# Patient Record
Sex: FEMALE | Race: WHITE | NOT HISPANIC OR LATINO | Employment: FULL TIME | URBAN - METROPOLITAN AREA
[De-identification: names, ages, dates, MRNs, and addresses within clinical notes are randomized per-mention and may not be internally consistent; named-entity substitution may affect disease eponyms.]

---

## 2017-02-06 ENCOUNTER — TRANSCRIBE ORDERS (OUTPATIENT)
Dept: ADMINISTRATIVE | Facility: HOSPITAL | Age: 52
End: 2017-02-06

## 2017-02-06 DIAGNOSIS — Z00.00 REGULAR CHECK-UP: Primary | ICD-10-CM

## 2017-02-09 ENCOUNTER — HOSPITAL ENCOUNTER (OUTPATIENT)
Dept: ULTRASOUND IMAGING | Facility: HOSPITAL | Age: 52
Discharge: HOME | End: 2017-02-09
Payer: COMMERCIAL

## 2017-02-09 DIAGNOSIS — Z00.00 REGULAR CHECK-UP: ICD-10-CM

## 2017-02-09 PROCEDURE — 76536 US EXAM OF HEAD AND NECK: CPT

## 2017-06-12 ENCOUNTER — TRANSCRIBE ORDERS (OUTPATIENT)
Dept: ADMINISTRATIVE | Facility: HOSPITAL | Age: 52
End: 2017-06-12

## 2017-06-12 DIAGNOSIS — Z12.31 ENCOUNTER FOR MAMMOGRAM TO ESTABLISH BASELINE MAMMOGRAM: Primary | ICD-10-CM

## 2017-07-10 ENCOUNTER — HOSPITAL ENCOUNTER (OUTPATIENT)
Dept: MAMMOGRAPHY | Facility: HOSPITAL | Age: 52
Discharge: HOME | End: 2017-07-10
Payer: COMMERCIAL

## 2017-07-10 DIAGNOSIS — Z12.31 ENCOUNTER FOR MAMMOGRAM TO ESTABLISH BASELINE MAMMOGRAM: ICD-10-CM

## 2017-07-10 PROCEDURE — G0202 SCR MAMMO BI INCL CAD: HCPCS

## 2018-01-04 ENCOUNTER — TRANSCRIBE ORDERS (OUTPATIENT)
Dept: ADMINISTRATIVE | Facility: HOSPITAL | Age: 53
End: 2018-01-04

## 2018-01-04 DIAGNOSIS — L98.9 LESION OF NECK: ICD-10-CM

## 2018-01-04 DIAGNOSIS — IMO0002 MASS: Primary | ICD-10-CM

## 2018-01-05 ENCOUNTER — HOSPITAL ENCOUNTER (OUTPATIENT)
Dept: RADIOLOGY | Age: 53
Discharge: HOME | End: 2018-01-05
Payer: COMMERCIAL

## 2018-01-05 ENCOUNTER — GENERIC CONVERSION - ENCOUNTER (OUTPATIENT)
Dept: OTHER | Facility: OTHER | Age: 53
End: 2018-01-05

## 2018-01-05 ENCOUNTER — APPOINTMENT (OUTPATIENT)
Dept: RADIOLOGY | Age: 53
End: 2018-01-05
Payer: COMMERCIAL

## 2018-01-05 DIAGNOSIS — IMO0002 MASS: ICD-10-CM

## 2018-01-05 DIAGNOSIS — L98.9 LESION OF NECK: ICD-10-CM

## 2018-01-05 PROCEDURE — 76536 US EXAM OF HEAD AND NECK: CPT

## 2018-01-05 PROCEDURE — 71046 X-RAY EXAM CHEST 2 VIEWS: CPT

## 2018-01-29 ENCOUNTER — TRANSCRIBE ORDERS (OUTPATIENT)
Dept: ADMINISTRATIVE | Facility: HOSPITAL | Age: 53
End: 2018-01-29

## 2018-01-29 DIAGNOSIS — N63.20 LEFT BREAST LUMP: Primary | ICD-10-CM

## 2018-01-31 ENCOUNTER — HOSPITAL ENCOUNTER (OUTPATIENT)
Dept: ULTRASOUND IMAGING | Facility: CLINIC | Age: 53
Discharge: HOME | End: 2018-01-31
Payer: COMMERCIAL

## 2018-01-31 ENCOUNTER — HOSPITAL ENCOUNTER (OUTPATIENT)
Dept: MAMMOGRAPHY | Facility: CLINIC | Age: 53
Discharge: HOME | End: 2018-01-31
Payer: COMMERCIAL

## 2018-01-31 DIAGNOSIS — N63.20 LEFT BREAST LUMP: ICD-10-CM

## 2018-01-31 PROCEDURE — 76642 ULTRASOUND BREAST LIMITED: CPT

## 2018-01-31 PROCEDURE — 77065 DX MAMMO INCL CAD UNI: CPT

## 2018-08-06 ENCOUNTER — TRANSCRIBE ORDERS (OUTPATIENT)
Dept: ADMINISTRATIVE | Facility: HOSPITAL | Age: 53
End: 2018-08-06

## 2018-08-06 DIAGNOSIS — Z12.31 ENCOUNTER FOR SCREENING MAMMOGRAM FOR MALIGNANT NEOPLASM OF BREAST: Primary | ICD-10-CM

## 2018-08-21 ENCOUNTER — HOSPITAL ENCOUNTER (OUTPATIENT)
Dept: MAMMOGRAPHY | Facility: CLINIC | Age: 53
Discharge: HOME | End: 2018-08-21
Payer: COMMERCIAL

## 2018-08-21 DIAGNOSIS — Z12.31 ENCOUNTER FOR SCREENING MAMMOGRAM FOR MALIGNANT NEOPLASM OF BREAST: ICD-10-CM

## 2018-08-21 PROCEDURE — 77063 BREAST TOMOSYNTHESIS BI: CPT

## 2018-08-21 PROCEDURE — 77067 SCR MAMMO BI INCL CAD: CPT

## 2018-11-30 ENCOUNTER — HOSPITAL ENCOUNTER (EMERGENCY)
Facility: HOSPITAL | Age: 53
Discharge: HOME | End: 2018-11-30
Attending: EMERGENCY MEDICINE
Payer: COMMERCIAL

## 2018-11-30 ENCOUNTER — APPOINTMENT (EMERGENCY)
Dept: RADIOLOGY | Facility: HOSPITAL | Age: 53
End: 2018-11-30
Payer: COMMERCIAL

## 2018-11-30 VITALS
RESPIRATION RATE: 16 BRPM | SYSTOLIC BLOOD PRESSURE: 147 MMHG | OXYGEN SATURATION: 98 % | WEIGHT: 203.09 LBS | BODY MASS INDEX: 34.86 KG/M2 | DIASTOLIC BLOOD PRESSURE: 70 MMHG | TEMPERATURE: 97.7 F | HEART RATE: 55 BPM

## 2018-11-30 DIAGNOSIS — R07.9 CHEST PAIN: Primary | ICD-10-CM

## 2018-11-30 LAB
ALBUMIN SERPL BCP-MCNC: 3.7 G/DL (ref 3.5–5)
ALP SERPL-CCNC: 99 U/L (ref 46–116)
ALT SERPL W P-5'-P-CCNC: 21 U/L (ref 12–78)
ANION GAP SERPL CALCULATED.3IONS-SCNC: 9 MMOL/L (ref 4–13)
AST SERPL W P-5'-P-CCNC: 13 U/L (ref 5–45)
ATRIAL RATE: 54 BPM
BASOPHILS # BLD AUTO: 0.05 THOUSANDS/ΜL (ref 0–0.1)
BASOPHILS NFR BLD AUTO: 1 % (ref 0–1)
BILIRUB SERPL-MCNC: 0.5 MG/DL (ref 0.2–1)
BUN SERPL-MCNC: 14 MG/DL (ref 5–25)
CALCIUM SERPL-MCNC: 8.7 MG/DL (ref 8.3–10.1)
CHLORIDE SERPL-SCNC: 104 MMOL/L (ref 100–108)
CO2 SERPL-SCNC: 26 MMOL/L (ref 21–32)
CREAT SERPL-MCNC: 0.61 MG/DL (ref 0.6–1.3)
DEPRECATED D DIMER PPP: <270 NG/ML (FEU)
EOSINOPHIL # BLD AUTO: 0.13 THOUSAND/ΜL (ref 0–0.61)
EOSINOPHIL NFR BLD AUTO: 2 % (ref 0–6)
ERYTHROCYTE [DISTWIDTH] IN BLOOD BY AUTOMATED COUNT: 16.7 % (ref 11.6–15.1)
GFR SERPL CREATININE-BSD FRML MDRD: 105 ML/MIN/1.73SQ M
GLUCOSE SERPL-MCNC: 93 MG/DL (ref 65–140)
HCT VFR BLD AUTO: 40.7 % (ref 34.8–46.1)
HGB BLD-MCNC: 13 G/DL (ref 11.5–15.4)
IMM GRANULOCYTES # BLD AUTO: 0.03 THOUSAND/UL (ref 0–0.2)
IMM GRANULOCYTES NFR BLD AUTO: 0 % (ref 0–2)
LYMPHOCYTES # BLD AUTO: 3.18 THOUSANDS/ΜL (ref 0.6–4.47)
LYMPHOCYTES NFR BLD AUTO: 38 % (ref 14–44)
MCH RBC QN AUTO: 25.6 PG (ref 26.8–34.3)
MCHC RBC AUTO-ENTMCNC: 31.9 G/DL (ref 31.4–37.4)
MCV RBC AUTO: 80 FL (ref 82–98)
MONOCYTES # BLD AUTO: 0.51 THOUSAND/ΜL (ref 0.17–1.22)
MONOCYTES NFR BLD AUTO: 6 % (ref 4–12)
NEUTROPHILS # BLD AUTO: 4.47 THOUSANDS/ΜL (ref 1.85–7.62)
NEUTS SEG NFR BLD AUTO: 53 % (ref 43–75)
NRBC BLD AUTO-RTO: 0 /100 WBCS
P AXIS: 33 DEGREES
PLATELET # BLD AUTO: 363 THOUSANDS/UL (ref 149–390)
PMV BLD AUTO: 10 FL (ref 8.9–12.7)
POTASSIUM SERPL-SCNC: 4 MMOL/L (ref 3.5–5.3)
PR INTERVAL: 142 MS
PROT SERPL-MCNC: 7.6 G/DL (ref 6.4–8.2)
QRS AXIS: -67 DEGREES
QRSD INTERVAL: 82 MS
QT INTERVAL: 470 MS
QTC INTERVAL: 445 MS
RBC # BLD AUTO: 5.08 MILLION/UL (ref 3.81–5.12)
SODIUM SERPL-SCNC: 139 MMOL/L (ref 136–145)
T WAVE AXIS: 3 DEGREES
TROPONIN I SERPL-MCNC: <0.02 NG/ML
TSH SERPL DL<=0.05 MIU/L-ACNC: 1.21 UIU/ML (ref 0.36–3.74)
VENTRICULAR RATE: 54 BPM
WBC # BLD AUTO: 8.37 THOUSAND/UL (ref 4.31–10.16)

## 2018-11-30 PROCEDURE — 84443 ASSAY THYROID STIM HORMONE: CPT | Performed by: EMERGENCY MEDICINE

## 2018-11-30 PROCEDURE — 84484 ASSAY OF TROPONIN QUANT: CPT | Performed by: EMERGENCY MEDICINE

## 2018-11-30 PROCEDURE — 85379 FIBRIN DEGRADATION QUANT: CPT | Performed by: EMERGENCY MEDICINE

## 2018-11-30 PROCEDURE — 71046 X-RAY EXAM CHEST 2 VIEWS: CPT

## 2018-11-30 PROCEDURE — 85025 COMPLETE CBC W/AUTO DIFF WBC: CPT | Performed by: EMERGENCY MEDICINE

## 2018-11-30 PROCEDURE — 99285 EMERGENCY DEPT VISIT HI MDM: CPT

## 2018-11-30 PROCEDURE — 93010 ELECTROCARDIOGRAM REPORT: CPT | Performed by: INTERNAL MEDICINE

## 2018-11-30 PROCEDURE — 80053 COMPREHEN METABOLIC PANEL: CPT | Performed by: EMERGENCY MEDICINE

## 2018-11-30 PROCEDURE — 36415 COLL VENOUS BLD VENIPUNCTURE: CPT | Performed by: EMERGENCY MEDICINE

## 2018-11-30 PROCEDURE — 93005 ELECTROCARDIOGRAM TRACING: CPT

## 2018-11-30 NOTE — ED PROVIDER NOTES
History  Chief Complaint   Patient presents with    Chest Pain     Pt states on Monday she had an episode of palpiations  Today pt was standing at sink and had about a 30 min episode of left sided stabbing chest pain  Pt states also that she has lots of pressure in her chest waking her up out of her sleep  50-year-old female presents today complaining of chest pain which started this morning approximately 4 hours ago     It occurred while she was standing at the sink getting ready for work  She reports having palpitations approximately 1 week ago that lasted few minutes  The chest pain lasted for maybe 30 minutes  It did not radiate  She denies shortness of breath, nausea, or diaphoresis  There are no exacerbating or alleviating factors  History provided by:  Patient  Chest Pain   Pain location:  L chest  Pain quality: pressure    Pain radiates to:  Does not radiate  Pain radiates to the back: no    Pain severity:  Mild  Onset quality:  Sudden  Duration:  30 minutes  Timing:  Constant  Progression:  Resolved  Chronicity:  New  Context: at rest    Relieved by:  None tried  Worsened by:  Nothing tried  Ineffective treatments:  None tried  Associated symptoms: palpitations    Associated symptoms: no abdominal pain, no altered mental status, no back pain, no cough, no dizziness, no fever, no headache, no nausea, no shortness of breath and no weakness    Risk factors: no coronary artery disease, no high cholesterol and no hypertension        None       Past Medical History:   Diagnosis Date    Breast cancer ([facility] [address] )     Mitral valve prolapse        Past Surgical History:   Procedure Laterality Date     SECTION      MASTECTOMY         History reviewed  No pertinent family history  I have reviewed and agree with the history as documented      Social History   Substance Use Topics    Smoking status: Never Smoker    Smokeless tobacco: Not on file    Alcohol use Yes      Comment: weekly Review of Systems   Constitutional: Negative for chills and fever  HENT: Negative for congestion  Respiratory: Negative for cough and shortness of breath  Cardiovascular: Positive for chest pain and palpitations  Gastrointestinal: Negative for abdominal pain and nausea  Genitourinary: Negative for difficulty urinating  Musculoskeletal: Negative for back pain  Skin: Negative for pallor, rash and wound  Neurological: Negative for dizziness, weakness and headaches  Psychiatric/Behavioral: Negative for confusion  Physical Exam  Physical Exam   Constitutional: She is oriented to person, place, and time  She appears well-developed and well-nourished  She appears distressed  HENT:   Head: Normocephalic and atraumatic  Eyes: Pupils are equal, round, and reactive to light  EOM are normal    Neck: Normal range of motion  Neck supple  Cardiovascular: Normal rate and regular rhythm  Pulmonary/Chest: Effort normal and breath sounds normal    Abdominal: Soft  Bowel sounds are normal    Musculoskeletal: Normal range of motion  She exhibits no edema  Neurological: She is alert and oriented to person, place, and time  Skin: Skin is warm and dry  Capillary refill takes less than 2 seconds  Psychiatric: She has a normal mood and affect         Vital Signs  ED Triage Vitals   Temperature Pulse Respirations Blood Pressure SpO2   11/30/18 1121 11/30/18 1119 11/30/18 1119 11/30/18 1119 11/30/18 1119   97 7 °F (36 5 °C) 61 16 156/71 99 %      Temp Source Heart Rate Source Patient Position - Orthostatic VS BP Location FiO2 (%)   11/30/18 1121 11/30/18 1119 11/30/18 1119 11/30/18 1119 --   Oral Monitor Lying Right arm       Pain Score       11/30/18 1116       No Pain           Vitals:    11/30/18 1130 11/30/18 1132 11/30/18 1145 11/30/18 1219   BP: 156/71 135/77 135/77 147/70   Pulse: 64 63 62 55   Patient Position - Orthostatic VS:  Sitting  Sitting       Visual Acuity      ED Medications  Medications - No data to display    Diagnostic Studies  Results Reviewed     Procedure Component Value Units Date/Time    TSH [[id number]]  (Normal) Collected:  11/30/18 1145    Lab Status:  Final result Specimen:  Blood from Arm, Left Updated:  11/30/18 1218     TSH 3RD GENERATON 1 210 uIU/mL     Narrative:         Patients undergoing fluorescein dye angiography may retain small amounts of fluorescein in the body for 48-72 hours post procedure  Samples containing fluorescein can produce falsely depressed TSH values  If the patient had this procedure,a specimen should be resubmitted post fluorescein clearance  The recommended reference ranges for TSH during pregnancy are as follows:  First trimester 0 1 to 2 5 uIU/mL  Second trimester  0 2 to 3 0 uIU/mL  Third trimester 0 3 to 3 0 uIU/m      Comprehensive metabolic panel [[id number]] Collected:  11/30/18 1145    Lab Status:  Final result Specimen:  Blood from Arm, Left Updated:  11/30/18 1209     Sodium 139 mmol/L      Potassium 4 0 mmol/L      Chloride 104 mmol/L      CO2 26 mmol/L      ANION GAP 9 mmol/L      BUN 14 mg/dL      Creatinine 0 61 mg/dL      Glucose 93 mg/dL      Calcium 8 7 mg/dL      AST 13 U/L      ALT 21 U/L      Alkaline Phosphatase 99 U/L      Total Protein 7 6 g/dL      Albumin 3 7 g/dL      Total Bilirubin 0 50 mg/dL      eGFR 105 ml/min/1 73sq m     Narrative:         National Kidney Disease Education Program recommendations are as follows:  GFR calculation is accurate only with a steady state creatinine  Chronic Kidney disease less than 60 ml/min/1 73 sq  meters  Kidney failure less than 15 ml/min/1 73 sq  meters      Troponin I [[id number]]  (Normal) Collected:  11/30/18 1145    Lab Status:  Final result Specimen:  Blood from Arm, Left Updated:  11/30/18 1209     Troponin I <0 02 ng/mL     D-Dimer [[id number]]  (Normal) Collected:  11/30/18 1147    Lab Status:  Final result Specimen:  Blood from Arm, Left Updated:  11/30/18 1204 D-Dimer, Quant <270 ng/ml (FEU)     CBC and differential [[id number]]  (Abnormal) Collected:  11/30/18 1145    Lab Status:  Final result Specimen:  Blood from Arm, Left Updated:  11/30/18 1156     WBC 8 37 Thousand/uL      RBC 5 08 Million/uL      Hemoglobin 13 0 g/dL      Hematocrit 40 7 %      MCV 80 (L) fL      MCH 25 6 (L) pg      MCHC 31 9 g/dL      RDW 16 7 (H) %      MPV 10 0 fL      Platelets 617 Thousands/uL      nRBC 0 /100 WBCs      Neutrophils Relative 53 %      Immat GRANS % 0 %      Lymphocytes Relative 38 %      Monocytes Relative 6 %      Eosinophils Relative 2 %      Basophils Relative 1 %      Neutrophils Absolute 4 47 Thousands/µL      Immature Grans Absolute 0 03 Thousand/uL      Lymphocytes Absolute 3 18 Thousands/µL      Monocytes Absolute 0 51 Thousand/µL      Eosinophils Absolute 0 13 Thousand/µL      Basophils Absolute 0 05 Thousands/µL                  XR chest 2 views    (Results Pending)              Procedures  ECG 12 Lead Documentation  Date/Time: 11/30/2018 11:48 AM  Performed by: Adeline Carcamo  Authorized by: Adeline Carcamo     Indications / Diagnosis:  Chest pain  ECG reviewed by me, the ED Provider: yes    Patient location:  ED  Previous ECG:     Previous ECG:  Unavailable  Comments:      Sinus bradycardia at 54 beats per minute  Leftward axis with a left anterior fascicular block  Slow R progression  T-wave inversion in lead 3 and AVF  T-wave flattening in the precordial leads    No significant change compared to prior from 06/25/08           Phone Contacts  ED Phone Contact    ED Course         HEART Risk Score      Most Recent Value   History  0 Filed at: 11/30/2018 1233   ECG  0 Filed at: 11/30/2018 1233   Age  1 Filed at: 11/30/2018 1233   Risk Factors  0 Filed at: 11/30/2018 1233   Troponin  0 Filed at: 11/30/2018 1233   Heart Score Risk Calculator   History  0 Filed at: 11/30/2018 1233   ECG  0 Filed at: 11/30/2018 1233   Age  1 Filed at: 11/30/2018 1233   Risk Factors  0 Filed at: 11/30/2018 1233   Troponin  0 Filed at: 11/30/2018 1233   HEART Score  1 Filed at: 11/30/2018 1233   HEART Score  1 Filed at: 11/30/2018 1233                            MDM  Number of Diagnoses or Management Options  Chest pain: new and requires workup  Diagnosis management comments: 12:41 PM  The patient and I discussed the possibility of them being admitted to the hospital for testing and observation and they understand the estimated risk of a heart attack or death is about 0 4% or 1 in 250   Based on that number, the patient has chosen to follow up with their primary care doctor instead  The patient is clinically sober and appears free from distracting injury  The patient appears to have intact insight, judgment, and reason  In my opinion, this patient has the capacity to make decisions  Amount and/or Complexity of Data Reviewed  Clinical lab tests: ordered and reviewed  Tests in the radiology section of CPT®: ordered and reviewed  Tests in the medicine section of CPT®: reviewed and ordered  Decide to obtain previous medical records or to obtain history from someone other than the patient: yes  Review and summarize past medical records: yes  Independent visualization of images, tracings, or specimens: yes    Risk of Complications, Morbidity, and/or Mortality  Presenting problems: high  Diagnostic procedures: high  Management options: moderate    Patient Progress  Patient progress: stable    CritCare Time    Disposition  Final diagnoses:   Chest pain     Time reflects when diagnosis was documented in both MDM as applicable and the Disposition within this note     Time User Action Codes Description Comment    11/30/2018 11:49 AM Janette Lagos Add [R07 9] Chest pain       ED Disposition     ED Disposition Condition Comment    Discharge  Daily Ibrahim discharge to home/self care      Condition at discharge: Stable        Follow-up Information     Follow up With Specialties Details Why Contact Info Additional Information    Sohail Riojas MD Internal Medicine Schedule an appointment as soon as possible for a visit  [address] [facility] [address] [facility] Emergency Department Emergency Medicine  If symptoms worsen [address] [address]  [phone] [facility] ED, [address], [place], [address] [facility], [address]          Patient's Medications    No medications on file     No discharge procedures on file      ED Provider  Electronically Signed by           Marly Luke DO  11/30/18 1244

## 2018-11-30 NOTE — DISCHARGE INSTRUCTIONS
Chest Pain   WHAT YOU NEED TO KNOW:   Chest pain can be caused by a range of conditions, from not serious to life-threatening  Chest pain can be a symptom of a digestive problem, such as acid reflux or a stomach ulcer  An anxiety attack or a strong emotion, such as anger, can also cause chest pain  Infection, inflammation, or a fracture in the bones or cartilage in your chest can cause pain or discomfort  Sometimes chest pain or pressure is caused by poor blood flow to your heart (angina)  Chest pain may also be caused by life-threatening conditions such as a heart attack or blood clot in your lungs  DISCHARGE INSTRUCTIONS:   Call 911 if:   · You have any of the following signs of a heart attack:      ¨ Squeezing, pressure, or pain in your chest that lasts longer than 5 minutes or returns    ¨ Discomfort or pain in your back, neck, jaw, stomach, or arm     ¨ Trouble breathing    ¨ Nausea or vomiting    ¨ Lightheadedness or a sudden cold sweat, especially with chest pain or trouble breathing    Return to the emergency department if:   · You have chest discomfort that gets worse, even with medicine  · You cough or vomit blood  · Your bowel movements are black or bloody  · You cannot stop vomiting, or it hurts to swallow  Contact your healthcare provider if:   · You have questions or concerns about your condition or care  Medicines:   · Medicines  may be given to treat the cause of your chest pain  Examples include pain medicine, anxiety medicine, or medicines to increase blood flow to your heart  · Do not take certain medicines without asking your healthcare provider first   These include NSAIDs, herbal or vitamin supplements, or hormones (estrogen or progestin)  · Take your medicine as directed  Contact your healthcare provider if you think your medicine is not helping or if you have side effects  Tell him or her if you are allergic to any medicine   Keep a list of the medicines, vitamins, and herbs you take  Include the amounts, and when and why you take them  Bring the list or the pill bottles to follow-up visits  Carry your medicine list with you in case of an emergency  Follow up with your healthcare provider within 72 hours, or as directed: You may need to return for more tests to find the cause of your chest pain  You may be referred to a specialist, such as a cardiologist or gastroenterologist  Write down your questions so you remember to ask them during your visits  Healthy living tips: The following are general healthy guidelines  If your chest pain is caused by a heart problem, your healthcare provider will give you specific guidelines to follow  · Do not smoke  Nicotine and other chemicals in cigarettes and cigars can cause lung and heart damage  Ask your healthcare provider for information if you currently smoke and need help to quit  E-cigarettes or smokeless tobacco still contain nicotine  Talk to your healthcare provider before you use these products  · Eat a variety of healthy, low-fat foods  Healthy foods include fruits, vegetables, whole-grain breads, low-fat dairy products, beans, lean meats, and fish  Ask for more information about a heart healthy diet  · Ask about activity  Your healthcare provider will tell you which activities to limit or avoid  Ask when you can drive, return to work, and have sex  Ask about the best exercise plan for you  · Maintain a healthy weight  Ask your healthcare provider how much you should weigh  Ask him or her to help you create a weight loss plan if you are overweight  · Get the flu and pneumonia vaccines  All adults should get the influenza (flu) vaccine  Get it every year as soon as it becomes available  The pneumococcal vaccine is given to adults aged 72 years or older  The vaccine is given every 5 years to prevent pneumococcal disease, such as pneumonia    © 2017 [employer] Information is for End User's use only and may not be sold, redistributed or otherwise used for commercial purposes  All illustrations and images included in CareNotes® are the copyrighted property of A D A M , Inc  or Aldo Dwyer  The above information is an  only  It is not intended as medical advice for individual conditions or treatments  Talk to your doctor, nurse or pharmacist before following any medical regimen to see if it is safe and effective for you

## 2019-04-23 ENCOUNTER — TRANSCRIBE ORDERS (OUTPATIENT)
Dept: ADMINISTRATIVE | Facility: HOSPITAL | Age: 54
End: 2019-04-23

## 2019-04-23 DIAGNOSIS — N63.0 LUMP OR MASS IN BREAST: Primary | ICD-10-CM

## 2019-04-24 ENCOUNTER — HOSPITAL ENCOUNTER (OUTPATIENT)
Dept: ULTRASOUND IMAGING | Facility: CLINIC | Age: 54
Discharge: HOME | End: 2019-04-24
Payer: COMMERCIAL

## 2019-04-24 ENCOUNTER — HOSPITAL ENCOUNTER (OUTPATIENT)
Dept: MAMMOGRAPHY | Facility: CLINIC | Age: 54
Discharge: HOME | End: 2019-04-24
Payer: COMMERCIAL

## 2019-04-24 VITALS — WEIGHT: 193 LBS | HEIGHT: 63 IN | BODY MASS INDEX: 34.2 KG/M2

## 2019-04-24 DIAGNOSIS — N63.0 LUMP OR MASS IN BREAST: ICD-10-CM

## 2019-04-24 PROCEDURE — 76642 ULTRASOUND BREAST LIMITED: CPT

## 2019-04-24 PROCEDURE — 77065 DX MAMMO INCL CAD UNI: CPT

## 2019-04-24 PROCEDURE — G0279 TOMOSYNTHESIS, MAMMO: HCPCS

## 2019-10-04 ENCOUNTER — TRANSCRIBE ORDERS (OUTPATIENT)
Dept: ADMINISTRATIVE | Facility: HOSPITAL | Age: 54
End: 2019-10-04

## 2019-10-04 DIAGNOSIS — N63.0 LUMP OR MASS IN BREAST: Primary | ICD-10-CM

## 2019-10-07 ENCOUNTER — HOSPITAL ENCOUNTER (OUTPATIENT)
Dept: MAMMOGRAPHY | Facility: CLINIC | Age: 54
Discharge: HOME | End: 2019-10-07
Payer: COMMERCIAL

## 2019-10-07 DIAGNOSIS — N63.0 LUMP OR MASS IN BREAST: ICD-10-CM

## 2019-10-07 PROCEDURE — 77065 DX MAMMO INCL CAD UNI: CPT

## 2019-10-07 PROCEDURE — G0279 TOMOSYNTHESIS, MAMMO: HCPCS

## 2020-09-15 ENCOUNTER — TRANSCRIBE ORDERS (OUTPATIENT)
Dept: ADMINISTRATIVE | Facility: HOSPITAL | Age: 55
End: 2020-09-15

## 2020-09-15 DIAGNOSIS — Z85.3 PERSONAL HISTORY OF MALIGNANT NEOPLASM OF BREAST: Primary | ICD-10-CM

## 2020-09-23 ENCOUNTER — HOSPITAL ENCOUNTER (OUTPATIENT)
Dept: MAMMOGRAPHY | Facility: CLINIC | Age: 55
Discharge: HOME | End: 2020-09-23
Payer: COMMERCIAL

## 2020-09-23 VITALS — HEIGHT: 63 IN | WEIGHT: 193 LBS | BODY MASS INDEX: 34.2 KG/M2 | TEMPERATURE: 97 F

## 2020-09-23 DIAGNOSIS — Z85.3 PERSONAL HISTORY OF MALIGNANT NEOPLASM OF BREAST: ICD-10-CM

## 2020-09-23 PROCEDURE — 77065 DX MAMMO INCL CAD UNI: CPT

## 2020-09-23 PROCEDURE — G0279 TOMOSYNTHESIS, MAMMO: HCPCS

## 2021-06-07 ENCOUNTER — APPOINTMENT (EMERGENCY)
Dept: CT IMAGING | Facility: HOSPITAL | Age: 56
End: 2021-06-07
Payer: COMMERCIAL

## 2021-06-07 ENCOUNTER — HOSPITAL ENCOUNTER (EMERGENCY)
Facility: HOSPITAL | Age: 56
Discharge: HOME | End: 2021-06-07
Attending: EMERGENCY MEDICINE | Admitting: EMERGENCY MEDICINE
Payer: COMMERCIAL

## 2021-06-07 VITALS
HEART RATE: 54 BPM | OXYGEN SATURATION: 98 % | HEIGHT: 63 IN | RESPIRATION RATE: 16 BRPM | BODY MASS INDEX: 34.2 KG/M2 | WEIGHT: 193 LBS | SYSTOLIC BLOOD PRESSURE: 144 MMHG | DIASTOLIC BLOOD PRESSURE: 69 MMHG | TEMPERATURE: 97.9 F

## 2021-06-07 DIAGNOSIS — R10.9 RIGHT FLANK PAIN: Primary | ICD-10-CM

## 2021-06-07 LAB
ANION GAP SERPL CALCULATED.3IONS-SCNC: 8 MMOL/L (ref 4–13)
BASOPHILS # BLD AUTO: 0.05 THOUSANDS/ΜL (ref 0–0.1)
BASOPHILS NFR BLD AUTO: 1 % (ref 0–1)
BILIRUB UR QL STRIP: NEGATIVE
BUN SERPL-MCNC: 14 MG/DL (ref 5–25)
CALCIUM SERPL-MCNC: 8.9 MG/DL (ref 8.3–10.1)
CHLORIDE SERPL-SCNC: 105 MMOL/L (ref 100–108)
CLARITY UR: CLEAR
CO2 SERPL-SCNC: 28 MMOL/L (ref 21–32)
COLOR UR: YELLOW
CREAT SERPL-MCNC: 0.76 MG/DL (ref 0.6–1.3)
EOSINOPHIL # BLD AUTO: 0.2 THOUSAND/ΜL (ref 0–0.61)
EOSINOPHIL NFR BLD AUTO: 3 % (ref 0–6)
ERYTHROCYTE [DISTWIDTH] IN BLOOD BY AUTOMATED COUNT: 16.7 % (ref 11.6–15.1)
GFR SERPL CREATININE-BSD FRML MDRD: 89 ML/MIN/1.73SQ M
GLUCOSE SERPL-MCNC: 122 MG/DL (ref 65–140)
GLUCOSE UR STRIP-MCNC: NEGATIVE MG/DL
HCT VFR BLD AUTO: 43.8 % (ref 34.8–46.1)
HGB BLD-MCNC: 14.2 G/DL (ref 11.5–15.4)
HGB UR QL STRIP.AUTO: NEGATIVE
IMM GRANULOCYTES # BLD AUTO: 0.03 THOUSAND/UL (ref 0–0.2)
IMM GRANULOCYTES NFR BLD AUTO: 0 % (ref 0–2)
KETONES UR STRIP-MCNC: ABNORMAL MG/DL
LEUKOCYTE ESTERASE UR QL STRIP: NEGATIVE
LYMPHOCYTES # BLD AUTO: 2.48 THOUSANDS/ΜL (ref 0.6–4.47)
LYMPHOCYTES NFR BLD AUTO: 36 % (ref 14–44)
MCH RBC QN AUTO: 26.9 PG (ref 26.8–34.3)
MCHC RBC AUTO-ENTMCNC: 32.4 G/DL (ref 31.4–37.4)
MCV RBC AUTO: 83 FL (ref 82–98)
MONOCYTES # BLD AUTO: 0.51 THOUSAND/ΜL (ref 0.17–1.22)
MONOCYTES NFR BLD AUTO: 8 % (ref 4–12)
NEUTROPHILS # BLD AUTO: 3.57 THOUSANDS/ΜL (ref 1.85–7.62)
NEUTS SEG NFR BLD AUTO: 52 % (ref 43–75)
NITRITE UR QL STRIP: NEGATIVE
NRBC BLD AUTO-RTO: 0 /100 WBCS
PH UR STRIP.AUTO: 7 [PH] (ref 4.5–8)
PLATELET # BLD AUTO: 344 THOUSANDS/UL (ref 149–390)
PMV BLD AUTO: 9.8 FL (ref 8.9–12.7)
POTASSIUM SERPL-SCNC: 4 MMOL/L (ref 3.5–5.3)
PROT UR STRIP-MCNC: NEGATIVE MG/DL
RBC # BLD AUTO: 5.27 MILLION/UL (ref 3.81–5.12)
SODIUM SERPL-SCNC: 141 MMOL/L (ref 136–145)
SP GR UR STRIP.AUTO: 1.02 (ref 1–1.03)
UROBILINOGEN UR QL STRIP.AUTO: 0.2 E.U./DL
WBC # BLD AUTO: 6.84 THOUSAND/UL (ref 4.31–10.16)

## 2021-06-07 PROCEDURE — 81003 URINALYSIS AUTO W/O SCOPE: CPT

## 2021-06-07 PROCEDURE — 36415 COLL VENOUS BLD VENIPUNCTURE: CPT | Performed by: EMERGENCY MEDICINE

## 2021-06-07 PROCEDURE — 74176 CT ABD & PELVIS W/O CONTRAST: CPT

## 2021-06-07 PROCEDURE — 96366 THER/PROPH/DIAG IV INF ADDON: CPT

## 2021-06-07 PROCEDURE — 96375 TX/PRO/DX INJ NEW DRUG ADDON: CPT

## 2021-06-07 PROCEDURE — 99284 EMERGENCY DEPT VISIT MOD MDM: CPT

## 2021-06-07 PROCEDURE — 80048 BASIC METABOLIC PNL TOTAL CA: CPT | Performed by: EMERGENCY MEDICINE

## 2021-06-07 PROCEDURE — 85025 COMPLETE CBC W/AUTO DIFF WBC: CPT | Performed by: EMERGENCY MEDICINE

## 2021-06-07 PROCEDURE — 96365 THER/PROPH/DIAG IV INF INIT: CPT

## 2021-06-07 PROCEDURE — 99285 EMERGENCY DEPT VISIT HI MDM: CPT | Performed by: EMERGENCY MEDICINE

## 2021-06-07 RX ORDER — ONDANSETRON 2 MG/ML
4 INJECTION INTRAMUSCULAR; INTRAVENOUS ONCE
Status: COMPLETED | OUTPATIENT
Start: 2021-06-07 | End: 2021-06-07

## 2021-06-07 RX ORDER — HYDROMORPHONE HCL/PF 1 MG/ML
1 SYRINGE (ML) INJECTION ONCE
Status: COMPLETED | OUTPATIENT
Start: 2021-06-07 | End: 2021-06-07

## 2021-06-07 RX ORDER — KETOROLAC TROMETHAMINE 30 MG/ML
15 INJECTION, SOLUTION INTRAMUSCULAR; INTRAVENOUS ONCE
Status: COMPLETED | OUTPATIENT
Start: 2021-06-07 | End: 2021-06-07

## 2021-06-07 RX ADMIN — HYDROMORPHONE HYDROCHLORIDE 1 MG: 1 INJECTION, SOLUTION INTRAMUSCULAR; INTRAVENOUS; SUBCUTANEOUS at 06:27

## 2021-06-07 RX ADMIN — KETOROLAC TROMETHAMINE 15 MG: 30 INJECTION, SOLUTION INTRAMUSCULAR at 05:58

## 2021-06-07 RX ADMIN — SODIUM CHLORIDE, SODIUM LACTATE, POTASSIUM CHLORIDE, AND CALCIUM CHLORIDE 1000 ML: .6; .31; .03; .02 INJECTION, SOLUTION INTRAVENOUS at 05:57

## 2021-06-07 RX ADMIN — ONDANSETRON 4 MG: 2 INJECTION INTRAMUSCULAR; INTRAVENOUS at 05:58

## 2021-06-07 NOTE — ED PROVIDER NOTES
History  Chief Complaint   Patient presents with    Flank Pain     r flank pain that started yesterday  denies hx of kidney stones  denies urinary symptoms  This is a 48 y o  old female who presents to the ED for evaluation of flank pain  Right-sided flank pain since yesterday, intermittent, sharp, can not get comfortable  Took ibuprofen twice with minimal relief  No urinary symptoms  No history of kidney stones  Urinated prior to coming to the ER  No chest pain or shortness of breath, fever chills  No recent trauma  None     Past Medical History:   Diagnosis Date    BRCA1 negative     BRCA2 negative     Breast cancer ([facility] [address] ) 2008    Right    History of radiation therapy     Mitral valve prolapse      Past Surgical History:   Procedure Laterality Date    BREAST BIOPSY Right 2008    Stereo     SECTION      MASTECTOMY Right 2008      Family History   Problem Relation Age of Onset    Breast cancer Mother 47    No Known Problems Father     No Known Problems Daughter     No Known Problems Maternal Grandmother     No Known Problems Maternal Grandfather     No Known Problems Paternal Grandmother     No Known Problems Paternal Grandfather     No Known Problems Daughter     No Known Problems Daughter     No Known Problems Daughter      I have reviewed and agree with the history as documented  E-Cigarette/Vaping     E-Cigarette/Vaping Substances     Social History     Tobacco Use    Smoking status: Never Smoker   Substance Use Topics    Alcohol use: Yes     Comment: weekly     Drug use: No     Review of Systems   Constitutional: Negative for chills, fatigue, fever and unexpected weight change  HENT: Negative for congestion, rhinorrhea and sore throat  Eyes: Negative for redness and visual disturbance  Respiratory: Negative for cough and shortness of breath  Cardiovascular: Negative for chest pain and leg swelling     Gastrointestinal: Negative for abdominal pain, constipation, diarrhea, nausea and vomiting  Endocrine: Negative for cold intolerance and heat intolerance  Genitourinary: Positive for flank pain  Negative for dysuria, frequency and urgency  Musculoskeletal: Negative for back pain  Skin: Negative for rash  Neurological: Negative for dizziness, syncope and numbness  All other systems reviewed and are negative  Physical Exam  Physical Exam  Vitals signs and nursing note reviewed  Constitutional:       General: She is not in acute distress  Appearance: She is well-developed  She is not diaphoretic  HENT:      Head: Normocephalic and atraumatic  Right Ear: External ear normal       Left Ear: External ear normal       Nose: Nose normal       Mouth/Throat:      Mouth: Mucous membranes are moist       Pharynx: No oropharyngeal exudate  Eyes:      Conjunctiva/sclera: Conjunctivae normal       Pupils: Pupils are equal, round, and reactive to light  Neck:      Musculoskeletal: Normal range of motion and neck supple  Cardiovascular:      Rate and Rhythm: Normal rate and regular rhythm  Heart sounds: Normal heart sounds  No murmur  No gallop  Pulmonary:      Effort: Pulmonary effort is normal       Breath sounds: Normal breath sounds  No wheezing  Chest:      Chest wall: No tenderness  Abdominal:      General: Bowel sounds are normal  There is no distension  Palpations: Abdomen is soft  Tenderness: There is no abdominal tenderness  There is right CVA tenderness  There is no guarding or rebound  Musculoskeletal: Normal range of motion  General: No tenderness or deformity  Lymphadenopathy:      Cervical: No cervical adenopathy  Skin:     General: Skin is warm and dry  Findings: No erythema or rash  Neurological:      Mental Status: She is alert and oriented to person, place, and time  Cranial Nerves: No cranial nerve deficit         Vital Signs  ED Triage Vitals   Temperature Pulse Respirations Blood Pressure SpO2   06/07/21 0537 06/07/21 0536 06/07/21 0536 06/07/21 0536 06/07/21 0536   97 9 °F (36 6 °C) 69 18 140/78 98 %      Temp Source Heart Rate Source Patient Position - Orthostatic VS BP Location FiO2 (%)   06/07/21 0537 06/07/21 0536 06/07/21 0943 06/07/21 0943 --   Oral Monitor Sitting Left arm       Pain Score       06/07/21 0536       9         ED Medications  Medications   ketorolac (TORADOL) injection 15 mg (15 mg Intravenous Given 6/7/21 0558)   ondansetron (ZOFRAN) injection 4 mg (4 mg Intravenous Given 6/7/21 0558)   lactated ringers bolus 1,000 mL (0 mL Intravenous Stopped 6/7/21 0842)   HYDROmorphone (DILAUDID) injection 1 mg (1 mg Intravenous Given 6/7/21 0627)     Diagnostic Studies  Results Reviewed     Procedure Component Value Units Date/Time    Urine Macroscopic, POC [[id number]]  (Abnormal) Collected: 06/07/21 0921    Lab Status: Final result Specimen: Urine Updated: 06/07/21 0923     Color, UA Yellow     Clarity, UA Clear     pH, UA 7 0     Leukocytes, UA Negative     Nitrite, UA Negative     Protein, UA Negative mg/dl      Glucose, UA Negative mg/dl      Ketones, UA Trace mg/dl      Urobilinogen, UA 0 2 E U /dl      Bilirubin, UA Negative     Blood, UA Negative     Specific Gravity, UA 1 025    Narrative:      CLINITEK RESULT    Basic metabolic panel [[id number]] Collected: 06/07/21 0553    Lab Status: Final result Specimen: Blood from Arm, Left Updated: 06/07/21 9920     Sodium 141 mmol/L      Potassium 4 0 mmol/L      Chloride 105 mmol/L      CO2 28 mmol/L      ANION GAP 8 mmol/L      BUN 14 mg/dL      Creatinine 0 76 mg/dL      Glucose 122 mg/dL      Calcium 8 9 mg/dL      eGFR 89 ml/min/1 73sq m     Narrative:      [place] guidelines for Chronic Kidney Disease (CKD):     Stage 1 with normal or high GFR (GFR > 90 mL/min/1 73 square meters)    Stage 2 Mild CKD (GFR = 60-89 mL/min/1 73 square meters)    Stage 3A Moderate CKD (GFR = 45-59 mL/min/1 73 square meters)    Stage 3B Moderate CKD (GFR = 30-44 mL/min/1 73 square meters)    Stage 4 Severe CKD (GFR = 15-29 mL/min/1 73 square meters)    Stage 5 End Stage CKD (GFR <15 mL/min/1 73 square meters)  Note: GFR calculation is accurate only with a steady state creatinine    CBC and differential [[id number]]  (Abnormal) Collected: 06/07/21 0553    Lab Status: Final result Specimen: Blood from Arm, Left Updated: 06/07/21 0602     WBC 6 84 Thousand/uL      RBC 5 27 Million/uL      Hemoglobin 14 2 g/dL      Hematocrit 43 8 %      MCV 83 fL      MCH 26 9 pg      MCHC 32 4 g/dL      RDW 16 7 %      MPV 9 8 fL      Platelets 628 Thousands/uL      nRBC 0 /100 WBCs      Neutrophils Relative 52 %      Immat GRANS % 0 %      Lymphocytes Relative 36 %      Monocytes Relative 8 %      Eosinophils Relative 3 %      Basophils Relative 1 %      Neutrophils Absolute 3 57 Thousands/µL      Immature Grans Absolute 0 03 Thousand/uL      Lymphocytes Absolute 2 48 Thousands/µL      Monocytes Absolute 0 51 Thousand/µL      Eosinophils Absolute 0 20 Thousand/µL      Basophils Absolute 0 05 Thousands/µL              CT renal stone study abdomen pelvis wo contrast   Final Result by Isis Gan MD (06/07 0017)      1  No urinary tract calculi identified  2   Diverticulosis without diverticulitis  Workstation performed: [id number]                Procedures  Procedures     ED Course        A/P: This is a 48 y o  female who presents to the ED for evaluation of flank pain  Suspect stone  Labs, pain control  UA  CT stone protocol  Reevaluate and dispo accordingly      MDM    Disposition  Final diagnoses:   Right flank pain     Time reflects when diagnosis was documented in both MDM as applicable and the Disposition within this note     Time User Action Codes Description Comment    6/7/2021  9:48 AM Aissatou Ordoñez Add [R10 9] Right flank pain       ED Disposition     ED Disposition Condition Date/Time Comment    Discharge Stable Mon Jun 7, 2021  9:48 AM Jamila Ibrahim discharge to home/self care  Follow-up Information     Follow up With Specialties Details Why Contact Info Additional Information    [facility] Emergency Medicine Go to  If symptoms worsen [address] Emergency Department, [address], [place], [place], [address]          There are no discharge medications for this patient  No discharge procedures on file      PDMP Review     None          ED Provider  Electronically Signed by           Juliet Villatoro MD  06/10/21 9846

## 2021-06-10 ENCOUNTER — CLINICAL SUPPORT (OUTPATIENT)
Dept: GENETICS | Facility: CLINIC | Age: 56
End: 2021-06-10

## 2021-06-10 DIAGNOSIS — Z80.3 FAMILY HISTORY OF BREAST CANCER: ICD-10-CM

## 2021-06-10 DIAGNOSIS — Z85.3 PERSONAL HISTORY OF BREAST CANCER: Primary | ICD-10-CM

## 2021-06-10 PROCEDURE — NC001 PR NO CHARGE: Performed by: GENETIC COUNSELOR, MS

## 2021-06-17 NOTE — PROGRESS NOTES
Pre-Test Genetic Counseling Consult Note    Patient Name: Juan Pablo Díaz   /Age: 1965/55 y o  Referring Provider: Self-referred    Date of Service: 6/10/2021   Genetic Counselor: Olu Mcelroy MS, [facility]  Interpretation Services: None  Location: In-person consult at [facility] of Visit: 61 minutes      Caroline Menjivar was referred to the [address] [place] [address] and Genetic Assessment Program due to her personal history of breast cancer and family history of melanoma and breast cancer  She presents today to discuss the possibility of a hereditary cancer syndrome, options for genetic testing, and implications for her and her family  Her daughter Dawson Everett accompanied her to the appointment  Genetic Testing History:    Ordering Providers: Tete Wright MD & Brandon Teran [address] [place] [address]  Report Date: 2012  Test: [facility] BRCA1-BRCA2 Analysis  Result: Negative     Cancer History and Treatment:     Personal History: History of right breast DCIS at age 40; status post right mastectomy     Screening Hx: Not Assessed     Medical and Surgical History  Pertinent surgical history:   Past Surgical History:   Procedure Laterality Date    BREAST BIOPSY Right 2008    Stereo     SECTION      MASTECTOMY Right 2008      Pertinent medical history:  Past Medical History:   Diagnosis Date    BRCA1 negative     BRCA2 negative     Breast cancer ([address] ) 2008    Right    History of radiation therapy 2008    Mitral valve prolapse        Other History:  Height:   Ht Readings from Last 1 Encounters:   21 5' 3" (1 6 m)     Weight:   Wt Readings from Last 1 Encounters:   21 87 5 kg (193 lb)     Relevant Family History   Patient reports no [employer]ken[employer] ancestry       - Father: Age 68 with melanoma  - Mother: Age 76 with breast cancer in her 52's  - Daughter: Age 21 with melanoma; she has red hair, fair skin, freckles and blue eyes     Please refer to the scanned pedigree in the Media Tab for a complete family history     *All history is reported as provided by the patient; records are not available for review, except where indicated  Assessment:  We discussed sporadic, familial and hereditary cancer  We also discussed the many factors that influence our risk for cancer such as age, environmental exposures, lifestyle choices and family history  We reviewed the indications suggestive of a hereditary predisposition to cancer  Yasmine Park had prior genetic testing including the BRCA1 and BRCA2 genes  Dilans prior genetic test result was negative which reduces the likelihood that she has a hereditary pre-disposition for cancer however it remains possible that:   There is a variant in an area of a gene which was not tested or there is a variant not detectable due to technical limitations of this test       There is a variant in another gene that was not included in this test or in a gene not known to be linked to cancer or tumors   A family member has a genetic variant that the patient did not inherit   The cancer in the family is sporadic and is related to non-hereditary factors  Genetic testing is indicated for Yasmine Park based on the following criteria: Meets NCCN V2 2021 Testing Criteria for High-Penetrance Breast and/or Ovarian Cancer Susceptibility Genes: Yasmine Park meets NCCN testing criteria based on her personal history of breast cancer under the age of 39  The current NCCN testing criteria recommend testing for the BRCA1, BRCA2, CDH1, PALB2, PTEN and TP53 genes  Anayeli's prior test did not include the CDH1, PALB2, PTEN and TP53 genes therefore it is reasonable for her to consider update testing  In addition, she did not have testing for the moderate risk breast cancer genes such as JULIENNE and CHEK2 which have clear management recommendations       The risks, benefits, and limitations of genetic testing were reviewed with the patient, as well as genetic discrimination laws, and possible test results (positive, negative, variants of uncertain significance) and their clinical implications  If positive for a mutation, options for managing cancer risk including increased surveillance, chemoprevention, and in some cases prophylactic surgery were discussed  Marylee Redden was informed that if a hereditary cancer syndrome was identified in her, first degree relatives (parents, siblings, and children) have a chance of also inheriting the condition  Genetic testing would allow for predictive genetic testing in other relatives, who may also be at risk depending on their degree of relation  Plan: Patient decided to proceed with testing and provided consent  Summary:     Sample Collection:  The patient's blood sample was drawn in the office on 6/10/2021 by medical assistant Lucia Dougherty    Genetic Testing Preformed: Ar[facility] (36 genes): APC, JULIENNE, AXIN2 BARD1, BRCA1, BRCA2, BRIP1, BMPR1A, CDH1, CDK4, CDKN2A, CHEK2, DICER1, EPCAM, GREM1, HOXB13, MLH1, MSH2, MSH3, MSH6, MUTYH, NBN, NF1, NTHL1, PALB2, PMS2, POLD1, POLE, PTEN, RAD51C, RAD51D, RECQL SMAD4, SMARCA4, STK11, TP53    Results take approximately 2-3 weeks to complete once test is started  We will contact Marylee Redden once results are available  Additional recommendations for surveillance/medical management will be made pending genetic test results

## 2021-07-02 ENCOUNTER — TELEPHONE (OUTPATIENT)
Dept: GENETICS | Facility: CLINIC | Age: 56
End: 2021-07-02

## 2021-07-02 NOTE — TELEPHONE ENCOUNTER
Post-Test Genetic Counseling Consult Note  Today I spoke with Glo Estrella over the phone to review the results of her genetic test for hereditary cancer  We met previously on 6/10/2021 for pre-test counseling  SUMMARY:    Test(s): [facility] (36 genes): APC, JULIENNE, AXIN2 BARD1, BRCA1, BRCA2, BRIP1, BMPR1A, CDH1, CDK4, CDKN2A, CHEK2, DICER1, EPCAM, GREM1, HOXB13, MLH1, MSH2, MSH3, MSH6, MUTYH, NBN, NF1, NTHL1, PALB2, PMS2, POLD1, POLE, PTEN, RAD51C, RAD51D, RECQL SMAD4, SMARCA4, STK11, TP53    Result: Negative - No Clinically Significant Variants Detected      Assessment:   A negative result significantly reduces the likelihood that Glo Estrella has a hereditary cancer syndrome  However, this testing is unable to completely rule out the presence of hereditary cancer  It remains possible that:  - There is a variant in an area of a gene which was not tested or there is a variant not detectable due to technical limitations of this test      - There is a variant in another gene that was not included in this test or in a gene not known to be linked to cancer or tumors  - A family member has a genetic variant that the patient did not inherit  - The cancer in the family is sporadic and is related to non-hereditary factors  Risks and Testing for Family Members:    Despite a negative result, Dilans daughters may still be at increased risk for breast cancer based on the family history  We recommend they discuss screening and management recommendations with their healthcare providers starting in their early 30's  At this time we do not recommend testing for Glo Billys children based on her negative test result  Glo Billys children still need to consider the history of cancer on the other side of their family when determining their risks  Additional Information:  A healthy lifestyle will improve overall health and reduce risk for illness  Eating a healthy diet and exercising for 4 hours per week is recommended   Both diet and exercise have been shown to help maintain a healthy weight  Moderate to heavy alcohol use can increase the risk for some cancers  Smoking cigarettes can also increase risk for breast, lung, prostate, pancreatic and other cancers  Plan:   There are no additional recommendations based on Anayeli's negative result  she should continue cancer screening and medical management as clinically indicated and as determined appropriate by her healthcare providers  Negative Result: Dru Santiago was strongly encouraged to contact us regarding any changes in her personal or family history of cancer as these changes could alter our recommendation regarding genetic testing and/or cancer screening

## 2021-07-06 ENCOUNTER — TELEPHONE (OUTPATIENT)
Dept: GENETICS | Facility: CLINIC | Age: 56
End: 2021-07-06

## 2021-07-06 NOTE — TELEPHONE ENCOUNTER
----- Message from Aurther Peabody, [facility] sent at 7/2/2021  1:02 PM EDT -----  Regarding: Mail test result and consult note  Thanks

## 2022-03-10 ENCOUNTER — OCCMED (OUTPATIENT)
Dept: URGENT CARE | Facility: CLINIC | Age: 57
End: 2022-03-10

## 2022-03-10 ENCOUNTER — APPOINTMENT (OUTPATIENT)
Dept: LAB | Facility: CLINIC | Age: 57
End: 2022-03-10

## 2022-03-10 DIAGNOSIS — Z23 ENCOUNTER FOR IMMUNIZATION: ICD-10-CM

## 2022-03-10 DIAGNOSIS — Z23 ENCOUNTER FOR IMMUNIZATION: Primary | ICD-10-CM

## 2022-03-10 LAB — RUBV IGG SERPL IA-ACNC: >175 IU/ML

## 2022-03-10 PROCEDURE — 86480 TB TEST CELL IMMUN MEASURE: CPT

## 2022-03-10 PROCEDURE — 86762 RUBELLA ANTIBODY: CPT

## 2022-03-10 PROCEDURE — 86735 MUMPS ANTIBODY: CPT

## 2022-03-10 PROCEDURE — 86787 VARICELLA-ZOSTER ANTIBODY: CPT

## 2022-03-10 PROCEDURE — 36415 COLL VENOUS BLD VENIPUNCTURE: CPT

## 2022-03-10 PROCEDURE — 86765 RUBEOLA ANTIBODY: CPT

## 2022-03-11 LAB
GAMMA INTERFERON BACKGROUND BLD IA-ACNC: 0.09 IU/ML
M TB IFN-G BLD-IMP: NEGATIVE
M TB IFN-G CD4+ BCKGRND COR BLD-ACNC: 0 IU/ML
M TB IFN-G CD4+ BCKGRND COR BLD-ACNC: 0.02 IU/ML
MEV IGG SER QL: NORMAL
MITOGEN IGNF BCKGRD COR BLD-ACNC: >10 IU/ML
MUV IGG SER QL: NORMAL
VZV IGG SER IA-ACNC: NORMAL

## 2022-07-12 ENCOUNTER — OFFICE VISIT (OUTPATIENT)
Dept: FAMILY MEDICINE CLINIC | Facility: CLINIC | Age: 57
End: 2022-07-12
Payer: COMMERCIAL

## 2022-07-12 VITALS
HEART RATE: 79 BPM | RESPIRATION RATE: 18 BRPM | SYSTOLIC BLOOD PRESSURE: 120 MMHG | HEIGHT: 62 IN | TEMPERATURE: 97.8 F | WEIGHT: 225.4 LBS | DIASTOLIC BLOOD PRESSURE: 80 MMHG | BODY MASS INDEX: 41.48 KG/M2 | OXYGEN SATURATION: 99 %

## 2022-07-12 DIAGNOSIS — Z23 NEED FOR VACCINATION FOR ZOSTER: ICD-10-CM

## 2022-07-12 DIAGNOSIS — Z00.00 PHYSICAL EXAM, ANNUAL: Primary | ICD-10-CM

## 2022-07-12 DIAGNOSIS — Z11.4 SCREENING FOR HIV (HUMAN IMMUNODEFICIENCY VIRUS): ICD-10-CM

## 2022-07-12 DIAGNOSIS — Z85.3 HISTORY OF BREAST CANCER: ICD-10-CM

## 2022-07-12 DIAGNOSIS — E66.01 CLASS 3 SEVERE OBESITY DUE TO EXCESS CALORIES WITHOUT SERIOUS COMORBIDITY WITH BODY MASS INDEX (BMI) OF 40.0 TO 44.9 IN ADULT (HCC): ICD-10-CM

## 2022-07-12 DIAGNOSIS — Z12.31 ENCOUNTER FOR SCREENING MAMMOGRAM FOR BREAST CANCER: ICD-10-CM

## 2022-07-12 DIAGNOSIS — Z11.59 NEED FOR HEPATITIS C SCREENING TEST: ICD-10-CM

## 2022-07-12 PROCEDURE — 90750 HZV VACC RECOMBINANT IM: CPT

## 2022-07-12 PROCEDURE — 99386 PREV VISIT NEW AGE 40-64: CPT | Performed by: FAMILY MEDICINE

## 2022-07-12 PROCEDURE — 90471 IMMUNIZATION ADMIN: CPT

## 2022-07-12 RX ORDER — ATORVASTATIN CALCIUM 10 MG/1
TABLET, FILM COATED ORAL
COMMUNITY
Start: 2014-01-26 | End: 2022-12-23

## 2022-07-12 NOTE — PROGRESS NOTES
Assessment/Plan:    No problem-specific Assessment & Plan notes found for this encounter  Diagnoses and all orders for this visit:        Physical exam, annual  Comments:  labs as ordered  shingrix today and in 2-6 mo  continue exercise  work on dietary change/weight reduction as able  check L mammogram  make appt with gyn    Orders:  -     Comprehensive metabolic panel; Future  -     CBC and differential; Future  -     Lipid panel; Future  -     Hemoglobin A1C; Future    History of breast cancer  -     Mammo diagnostic left w cad; Future    Need for hepatitis C screening test  -     Hepatitis C Antibody ([facility]); Future    Screening for HIV (human immunodeficiency virus)  -     HIV 1/2 Antigen/Antibody (4th Generation) w Reflex [facility]; Future    Encounter for screening mammogram for breast cancer    Need for vaccination for zoster  -     Zoster Vaccine Recombinant IM    Class 3 severe obesity due to excess calories without serious comorbidity with body mass index (BMI) of 40 0 to 44 9 in adult (HCC)  -     TSH, 3rd generation; Future        Subjective:      Patient ID: Niko Krishna is a 55 y o  female  HPI   Pt presents as new pt for physical exam   +hx of breast CA s/p radiation and mastectomy (R)  Due for mammogram   Due for gyn--will find new provider (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)  Due for shingrix  covid booster recommended  Pt exercises daily--goes to [employer]  Sees dentist   Hx of nml colonoscopy at age 49 in [place] but can't remember who did it--will call her dad and find out as he also got his at the same doc  Had tdap upon hire      The following portions of the patient's history were reviewed and updated as appropriate: allergies, current medications, past family history, past medical history, past social history, past surgical history and problem list     Review of Systems   Constitutional: Negative for chills, fatigue, fever and unexpected weight change  HENT: Negative for congestion, ear pain, hearing loss, postnasal drip, rhinorrhea, sinus pressure, sinus pain, sore throat, trouble swallowing and voice change  Eyes: Negative for pain, redness and visual disturbance  Respiratory: Negative for cough and shortness of breath  Cardiovascular: Negative for chest pain, palpitations and leg swelling  Gastrointestinal: Negative for abdominal pain, constipation, diarrhea and nausea  Endocrine: Negative for cold intolerance, heat intolerance, polydipsia and polyuria  Genitourinary: Negative for dysuria, frequency and urgency  Musculoskeletal: Negative for arthralgias, joint swelling and myalgias  Skin: Negative for rash  No suspicious lesions   Neurological: Negative for weakness, numbness and headaches  Hematological: Negative for adenopathy  Objective:      /80   Pulse 79   Temp 97 8 °F (36 6 °C)   Resp 18   Ht 5' 2" (1 575 m)   Wt 102 kg (225 lb 6 4 oz)   SpO2 99%   BMI 41 23 kg/m²          Physical Exam  Constitutional:       General: She is not in acute distress  Appearance: She is well-developed  HENT:      Head: Normocephalic and atraumatic  Right Ear: Tympanic membrane, ear canal and external ear normal       Left Ear: Tympanic membrane, ear canal and external ear normal       Nose: Nose normal       Mouth/Throat:      Pharynx: No oropharyngeal exudate  Eyes:      Conjunctiva/sclera: Conjunctivae normal       Pupils: Pupils are equal, round, and reactive to light  Neck:      Thyroid: No thyromegaly  Vascular: No carotid bruit or JVD  Cardiovascular:      Rate and Rhythm: Regular rhythm  Heart sounds: S1 normal and S2 normal  No murmur heard  No friction rub  No gallop  No S3 or S4 sounds  Pulmonary:      Effort: Pulmonary effort is normal       Breath sounds: Normal breath sounds  No wheezing, rhonchi or rales     Abdominal:      General: Bowel sounds are normal  There is no distension  Palpations: Abdomen is soft  Tenderness: There is no abdominal tenderness  Lymphadenopathy:      Cervical: No cervical adenopathy  Neurological:      Mental Status: She is alert and oriented to person, place, and time  Cranial Nerves: No cranial nerve deficit  Sensory: No sensory deficit  Deep Tendon Reflexes: Reflexes are normal and symmetric  BMI Counseling: Body mass index is 41 23 kg/m²  The BMI is above normal  Nutrition recommendations include encouraging healthy choices of fruits and vegetables  Exercise recommendations include vigorous physical activity 75 minutes/week  Rationale for BMI follow-up plan is due to patient being overweight or obese  Depression Screening and Follow-up Plan: Patient was screened for depression during today's encounter  They screened negative with a PHQ-2 score of 0  Detail Level: Detailed Quality 431: Preventive Care And Screening: Unhealthy Alcohol Use - Screening: Patient not identified as an unhealthy alcohol user when screened for unhealthy alcohol use using a systematic screening method Quality 130: Documentation Of Current Medications In The Medical Record: Current Medications Documented Quality 226: Preventive Care And Screening: Tobacco Use: Screening And Cessation Intervention: Patient screened for tobacco use and is an ex/non-smoker

## 2022-07-13 ENCOUNTER — TELEPHONE (OUTPATIENT)
Dept: ADMINISTRATIVE | Facility: OTHER | Age: 57
End: 2022-07-13

## 2022-07-13 NOTE — LETTER
Procedure Request Form: Colonoscopy      Date Requested: 22  Patient: Rodney Chimera  Patient : 1965   Referring Provider: Magali Silverio, DO    2nd Request        Date of Procedure ________most recent report______________________       The above patient has informed us that they have completed their   most recent Colonoscopy at your facility  Please complete   this form and attach all corresponding procedure reports/results  Comments __________________________________________________________  ____________________________________________________________________  ____________________________________________________________________  ____________________________________________________________________    Facility Completing Procedure _________________________________________    Form Completed By (print name) _______________________________________      Signature __________________________________________________________      These reports are needed for  compliance  Please fax this completed form and a copy of the procedure report to our office located at [place] [address] as soon as possible to [phone] yoli Garcia: Phone [phone]    We thank you for your assistance in treating our mutual patient

## 2022-07-13 NOTE — LETTER
Procedure Request Form: Colonoscopy      Date Requested: 22  Patient: Rodney Chimera  Patient : 1965   Referring Provider: Magali Silverio, DO        Date of Procedure ______most recent report________________________       The above patient has informed us that they have completed their   most recent Colonoscopy at your facility  Please complete   this form and attach all corresponding procedure reports/results  Comments __________________________________________________________  ____________________________________________________________________  ____________________________________________________________________  ____________________________________________________________________    Facility Completing Procedure _________________________________________    Form Completed By (print name) _______________________________________      Signature __________________________________________________________      These reports are needed for  compliance  Please fax this completed form and a copy of the procedure report to our office located at [place] [address] as soon as possible to [phone] yoli Garcia: Phone [phone]    We thank you for your assistance in treating our mutual patient

## 2022-07-13 NOTE — TELEPHONE ENCOUNTER
Upon review of the In Basket request we were able to locate, review, and update the patient chart as requested for Pap Smear (HPV) aka Cervical Cancer Screening  Any additional questions or concerns should be emailed to the Practice Liaisons via [email or web address] email, please do not reply via In Basket      Thank you  Jyoti Dan

## 2022-07-13 NOTE — TELEPHONE ENCOUNTER
----- Message from Jay Jay Debra sent at 7/12/2022  5:51 PM EDT -----  07/12/22 5:51 PM    Hello, our patient Ebenezer Monteiro has had Pap Smear (HPV) aka Cervical Cancer Screening completed/performed  Please assist in updating the patient chart by pulling a previous Electronic Medical Record (EMR) document  The previous EMR is [facility]  The date of service is 09/03/19      Thank you,  Margie Oviedo  [facility]  [place]

## 2022-07-13 NOTE — TELEPHONE ENCOUNTER
----- Message from Devonte Viera sent at 7/13/2022 10:54 AM EDT -----  Regarding: Tatyana grayson  07/13/22 10:54 AM    Hello, our patient Kathe Johns has had CRC: Colonoscopy completed/performed  Please assist in updating the patient chart by making an External outreach to   [facility] facility located in [facility]  The date of service is Unsure       Thank you,  Margie Oviedo  [facility]

## 2022-08-03 NOTE — TELEPHONE ENCOUNTER
As a follow-up, a second attempt has been made for outreach via fax, please see Contacts section for details      Thank you  [facility], [address] [phone] Fax [phone]

## 2022-09-14 ENCOUNTER — APPOINTMENT (OUTPATIENT)
Dept: LAB | Facility: AMBULARY SURGERY CENTER | Age: 57
End: 2022-09-14
Payer: COMMERCIAL

## 2022-09-14 DIAGNOSIS — Z11.4 SCREENING FOR HIV (HUMAN IMMUNODEFICIENCY VIRUS): ICD-10-CM

## 2022-09-14 DIAGNOSIS — Z00.8 HEALTH EXAMINATION IN POPULATION SURVEY: ICD-10-CM

## 2022-09-14 DIAGNOSIS — Z11.59 NEED FOR HEPATITIS C SCREENING TEST: ICD-10-CM

## 2022-09-14 DIAGNOSIS — Z00.00 PHYSICAL EXAM, ANNUAL: ICD-10-CM

## 2022-09-14 DIAGNOSIS — E66.01 CLASS 3 SEVERE OBESITY DUE TO EXCESS CALORIES WITHOUT SERIOUS COMORBIDITY WITH BODY MASS INDEX (BMI) OF 40.0 TO 44.9 IN ADULT (HCC): ICD-10-CM

## 2022-09-14 LAB
ALBUMIN SERPL BCP-MCNC: 3.3 G/DL (ref 3.5–5)
ALP SERPL-CCNC: 125 U/L (ref 46–116)
ALT SERPL W P-5'-P-CCNC: 27 U/L (ref 12–78)
ANION GAP SERPL CALCULATED.3IONS-SCNC: 6 MMOL/L (ref 4–13)
AST SERPL W P-5'-P-CCNC: 18 U/L (ref 5–45)
BASOPHILS # BLD AUTO: 0.05 THOUSANDS/ΜL (ref 0–0.1)
BASOPHILS NFR BLD AUTO: 1 % (ref 0–1)
BILIRUB SERPL-MCNC: 0.56 MG/DL (ref 0.2–1)
BUN SERPL-MCNC: 14 MG/DL (ref 5–25)
CALCIUM ALBUM COR SERPL-MCNC: 9.2 MG/DL (ref 8.3–10.1)
CALCIUM SERPL-MCNC: 8.6 MG/DL (ref 8.3–10.1)
CHLORIDE SERPL-SCNC: 105 MMOL/L (ref 96–108)
CHOLEST SERPL-MCNC: 224 MG/DL
CO2 SERPL-SCNC: 28 MMOL/L (ref 21–32)
CREAT SERPL-MCNC: 0.78 MG/DL (ref 0.6–1.3)
EOSINOPHIL # BLD AUTO: 0.15 THOUSAND/ΜL (ref 0–0.61)
EOSINOPHIL NFR BLD AUTO: 2 % (ref 0–6)
ERYTHROCYTE [DISTWIDTH] IN BLOOD BY AUTOMATED COUNT: 14.6 % (ref 11.6–15.1)
EST. AVERAGE GLUCOSE BLD GHB EST-MCNC: 114 MG/DL
GFR SERPL CREATININE-BSD FRML MDRD: 85 ML/MIN/1.73SQ M
GLUCOSE SERPL-MCNC: 98 MG/DL (ref 65–140)
HBA1C MFR BLD: 5.6 %
HCT VFR BLD AUTO: 42.6 % (ref 34.8–46.1)
HCV AB SER QL: NORMAL
HDLC SERPL-MCNC: 64 MG/DL
HGB BLD-MCNC: 13.8 G/DL (ref 11.5–15.4)
IMM GRANULOCYTES # BLD AUTO: 0.02 THOUSAND/UL (ref 0–0.2)
IMM GRANULOCYTES NFR BLD AUTO: 0 % (ref 0–2)
LDLC SERPL CALC-MCNC: 143 MG/DL (ref 0–100)
LYMPHOCYTES # BLD AUTO: 1.67 THOUSANDS/ΜL (ref 0.6–4.47)
LYMPHOCYTES NFR BLD AUTO: 27 % (ref 14–44)
MCH RBC QN AUTO: 27.3 PG (ref 26.8–34.3)
MCHC RBC AUTO-ENTMCNC: 32.4 G/DL (ref 31.4–37.4)
MCV RBC AUTO: 84 FL (ref 82–98)
MONOCYTES # BLD AUTO: 0.44 THOUSAND/ΜL (ref 0.17–1.22)
MONOCYTES NFR BLD AUTO: 7 % (ref 4–12)
NEUTROPHILS # BLD AUTO: 3.91 THOUSANDS/ΜL (ref 1.85–7.62)
NEUTS SEG NFR BLD AUTO: 63 % (ref 43–75)
NONHDLC SERPL-MCNC: 160 MG/DL
NRBC BLD AUTO-RTO: 0 /100 WBCS
PLATELET # BLD AUTO: 160 THOUSANDS/UL (ref 149–390)
PMV BLD AUTO: 11.3 FL (ref 8.9–12.7)
POTASSIUM SERPL-SCNC: 4.1 MMOL/L (ref 3.5–5.3)
PROT SERPL-MCNC: 7.4 G/DL (ref 6.4–8.4)
RBC # BLD AUTO: 5.05 MILLION/UL (ref 3.81–5.12)
SODIUM SERPL-SCNC: 139 MMOL/L (ref 135–147)
TRIGL SERPL-MCNC: 87 MG/DL
TSH SERPL DL<=0.05 MIU/L-ACNC: 1.18 UIU/ML (ref 0.45–4.5)
WBC # BLD AUTO: 6.24 THOUSAND/UL (ref 4.31–10.16)

## 2022-09-14 PROCEDURE — 83036 HEMOGLOBIN GLYCOSYLATED A1C: CPT

## 2022-09-14 PROCEDURE — 87389 HIV-1 AG W/HIV-1&-2 AB AG IA: CPT

## 2022-09-14 PROCEDURE — 36415 COLL VENOUS BLD VENIPUNCTURE: CPT

## 2022-09-14 PROCEDURE — 84443 ASSAY THYROID STIM HORMONE: CPT

## 2022-09-14 PROCEDURE — 80053 COMPREHEN METABOLIC PANEL: CPT

## 2022-09-14 PROCEDURE — 80061 LIPID PANEL: CPT

## 2022-09-14 PROCEDURE — 85025 COMPLETE CBC W/AUTO DIFF WBC: CPT

## 2022-09-14 PROCEDURE — 86803 HEPATITIS C AB TEST: CPT

## 2022-09-15 LAB — HIV 1+2 AB+HIV1 P24 AG SERPL QL IA: NORMAL

## 2022-10-14 ENCOUNTER — TELEPHONE (OUTPATIENT)
Dept: FAMILY MEDICINE CLINIC | Facility: CLINIC | Age: 57
End: 2022-10-14

## 2022-10-14 ENCOUNTER — CLINICAL SUPPORT (OUTPATIENT)
Dept: FAMILY MEDICINE CLINIC | Facility: CLINIC | Age: 57
End: 2022-10-14
Payer: COMMERCIAL

## 2022-10-14 DIAGNOSIS — Z23 ENCOUNTER FOR IMMUNIZATION: Primary | ICD-10-CM

## 2022-10-14 PROCEDURE — 90750 HZV VACC RECOMBINANT IM: CPT

## 2022-10-14 PROCEDURE — 90471 IMMUNIZATION ADMIN: CPT

## 2022-12-22 ENCOUNTER — NURSE TRIAGE (OUTPATIENT)
Dept: OTHER | Facility: OTHER | Age: 57
End: 2022-12-22

## 2022-12-22 ENCOUNTER — APPOINTMENT (EMERGENCY)
Dept: CT IMAGING | Facility: HOSPITAL | Age: 57
End: 2022-12-22

## 2022-12-22 ENCOUNTER — HOSPITAL ENCOUNTER (OUTPATIENT)
Facility: HOSPITAL | Age: 57
Setting detail: OBSERVATION
Discharge: HOME | End: 2022-12-23
Attending: EMERGENCY MEDICINE | Admitting: INTERNAL MEDICINE

## 2022-12-22 DIAGNOSIS — R42 DIZZINESS: ICD-10-CM

## 2022-12-22 DIAGNOSIS — R42 VERTIGO: Primary | ICD-10-CM

## 2022-12-22 PROBLEM — H81.10 BENIGN POSITIONAL VERTIGO: Status: ACTIVE | Noted: 2022-12-22

## 2022-12-22 LAB
ALBUMIN SERPL BCP-MCNC: 4.1 G/DL (ref 3.5–5)
ALP SERPL-CCNC: 101 U/L (ref 34–104)
ALT SERPL W P-5'-P-CCNC: 16 U/L (ref 7–52)
ANION GAP SERPL CALCULATED.3IONS-SCNC: 4 MMOL/L (ref 4–13)
AST SERPL W P-5'-P-CCNC: 21 U/L (ref 13–39)
BASOPHILS # BLD AUTO: 0.07 THOUSANDS/ÂΜL (ref 0–0.1)
BASOPHILS NFR BLD AUTO: 1 % (ref 0–1)
BILIRUB SERPL-MCNC: 0.39 MG/DL (ref 0.2–1)
BUN SERPL-MCNC: 14 MG/DL (ref 5–25)
CALCIUM SERPL-MCNC: 8.9 MG/DL (ref 8.4–10.2)
CHLORIDE SERPL-SCNC: 104 MMOL/L (ref 96–108)
CO2 SERPL-SCNC: 30 MMOL/L (ref 21–32)
CREAT SERPL-MCNC: 0.62 MG/DL (ref 0.6–1.3)
EOSINOPHIL # BLD AUTO: 0.13 THOUSAND/ÂΜL (ref 0–0.61)
EOSINOPHIL NFR BLD AUTO: 2 % (ref 0–6)
ERYTHROCYTE [DISTWIDTH] IN BLOOD BY AUTOMATED COUNT: 15.4 % (ref 11.6–15.1)
GFR SERPL CREATININE-BSD FRML MDRD: 101 ML/MIN/1.73SQ M
GLUCOSE P FAST SERPL-MCNC: 90 MG/DL (ref 65–99)
GLUCOSE SERPL-MCNC: 90 MG/DL (ref 65–140)
HCT VFR BLD AUTO: 42.9 % (ref 34.8–46.1)
HGB BLD-MCNC: 14.1 G/DL (ref 11.5–15.4)
IMM GRANULOCYTES # BLD AUTO: 0.03 THOUSAND/UL (ref 0–0.2)
IMM GRANULOCYTES NFR BLD AUTO: 0 % (ref 0–2)
LYMPHOCYTES # BLD AUTO: 2.07 THOUSANDS/ÂΜL (ref 0.6–4.47)
LYMPHOCYTES NFR BLD AUTO: 30 % (ref 14–44)
MCH RBC QN AUTO: 28.1 PG (ref 26.8–34.3)
MCHC RBC AUTO-ENTMCNC: 32.9 G/DL (ref 31.4–37.4)
MCV RBC AUTO: 86 FL (ref 82–98)
MONOCYTES # BLD AUTO: 0.55 THOUSAND/ÂΜL (ref 0.17–1.22)
MONOCYTES NFR BLD AUTO: 8 % (ref 4–12)
NEUTROPHILS # BLD AUTO: 4.15 THOUSANDS/ÂΜL (ref 1.85–7.62)
NEUTS SEG NFR BLD AUTO: 59 % (ref 43–75)
NRBC BLD AUTO-RTO: 0 /100 WBCS
PLATELET # BLD AUTO: 330 THOUSANDS/UL (ref 149–390)
PMV BLD AUTO: 10.1 FL (ref 8.9–12.7)
POTASSIUM SERPL-SCNC: 4.2 MMOL/L (ref 3.5–5.3)
PROT SERPL-MCNC: 7.3 G/DL (ref 6.4–8.4)
RBC # BLD AUTO: 5.02 MILLION/UL (ref 3.81–5.12)
SODIUM SERPL-SCNC: 138 MMOL/L (ref 135–147)
WBC # BLD AUTO: 7 THOUSAND/UL (ref 4.31–10.16)

## 2022-12-22 RX ORDER — ENOXAPARIN SODIUM 100 MG/ML
40 INJECTION SUBCUTANEOUS DAILY
Status: DISCONTINUED | OUTPATIENT
Start: 2022-12-22 | End: 2022-12-23 | Stop reason: HOSPADM

## 2022-12-22 RX ORDER — ONDANSETRON 2 MG/ML
4 INJECTION INTRAMUSCULAR; INTRAVENOUS EVERY 6 HOURS PRN
Status: DISCONTINUED | OUTPATIENT
Start: 2022-12-22 | End: 2022-12-23 | Stop reason: HOSPADM

## 2022-12-22 RX ORDER — SENNOSIDES 8.6 MG
1 TABLET ORAL DAILY
Status: DISCONTINUED | OUTPATIENT
Start: 2022-12-22 | End: 2022-12-23 | Stop reason: HOSPADM

## 2022-12-22 RX ORDER — MECLIZINE HCL 12.5 MG/1
25 TABLET ORAL ONCE
Status: COMPLETED | OUTPATIENT
Start: 2022-12-22 | End: 2022-12-22

## 2022-12-22 RX ORDER — ACETAMINOPHEN 325 MG/1
650 TABLET ORAL EVERY 6 HOURS PRN
Status: DISCONTINUED | OUTPATIENT
Start: 2022-12-22 | End: 2022-12-23 | Stop reason: HOSPADM

## 2022-12-22 RX ORDER — ONDANSETRON 4 MG/1
4 TABLET, ORALLY DISINTEGRATING ORAL ONCE
Status: COMPLETED | OUTPATIENT
Start: 2022-12-22 | End: 2022-12-22

## 2022-12-22 RX ORDER — MECLIZINE HYDROCHLORIDE 25 MG/1
25 TABLET ORAL EVERY 8 HOURS SCHEDULED
Status: DISCONTINUED | OUTPATIENT
Start: 2022-12-22 | End: 2022-12-23

## 2022-12-22 RX ORDER — DIAZEPAM 5 MG/1
5 TABLET ORAL ONCE
Status: COMPLETED | OUTPATIENT
Start: 2022-12-22 | End: 2022-12-22

## 2022-12-22 RX ORDER — DIAZEPAM 5 MG/ML
2.5 INJECTION, SOLUTION INTRAMUSCULAR; INTRAVENOUS EVERY 8 HOURS PRN
Status: DISCONTINUED | OUTPATIENT
Start: 2022-12-22 | End: 2022-12-23

## 2022-12-22 RX ORDER — KETOROLAC TROMETHAMINE 30 MG/ML
15 INJECTION, SOLUTION INTRAMUSCULAR; INTRAVENOUS ONCE
Status: COMPLETED | OUTPATIENT
Start: 2022-12-22 | End: 2022-12-22

## 2022-12-22 RX ORDER — DIAZEPAM 5 MG/ML
2.5 INJECTION, SOLUTION INTRAMUSCULAR; INTRAVENOUS ONCE
Status: COMPLETED | OUTPATIENT
Start: 2022-12-22 | End: 2022-12-22

## 2022-12-22 RX ADMIN — DIAZEPAM 2.5 MG: 10 INJECTION, SOLUTION INTRAMUSCULAR; INTRAVENOUS at 12:50

## 2022-12-22 RX ADMIN — ONDANSETRON 4 MG: 4 TABLET, ORALLY DISINTEGRATING ORAL at 10:11

## 2022-12-22 RX ADMIN — MECLIZINE 25 MG: 12.5 TABLET ORAL at 10:11

## 2022-12-22 RX ADMIN — DIAZEPAM 5 MG: 5 TABLET ORAL at 11:15

## 2022-12-22 RX ADMIN — KETOROLAC TROMETHAMINE 15 MG: 30 INJECTION, SOLUTION INTRAMUSCULAR at 12:49

## 2022-12-22 RX ADMIN — SODIUM CHLORIDE 500 ML: 0.9 INJECTION, SOLUTION INTRAVENOUS at 12:49

## 2022-12-22 RX ADMIN — MECLIZINE HYDROCHLORIDE 25 MG: 25 TABLET ORAL at 22:25

## 2022-12-22 RX ADMIN — MECLIZINE HYDROCHLORIDE 25 MG: 25 TABLET ORAL at 15:37

## 2022-12-22 NOTE — PLAN OF CARE
Problem: SAFETY ADULT  Goal: Patient will remain free of falls  Description: INTERVENTIONS:  - Educate patient/family on patient safety including physical limitations  - Instruct patient to call for assistance with activity   - Consult OT/PT to assist with strengthening/mobility   - Keep Call bell within reach  - Keep bed low and locked with side rails adjusted as appropriate  - Keep care items and personal belongings within reach  - Initiate and maintain comfort rounds  - Make Fall Risk Sign visible to staff  - Offer Toileting every 2 Hours, in advance of need  - Apply yellow socks and bracelet for high fall risk patients  - Consider moving patient to room near nurses station  Outcome: Progressing  Goal: Maintain or return to baseline ADL function  Description: INTERVENTIONS:  -  Assess patient's ability to carry out ADLs; assess patient's baseline for ADL function and identify physical deficits which impact ability to perform ADLs (bathing, care of mouth/teeth, toileting, grooming, dressing, etc )  - Assess/evaluate cause of self-care deficits   - Assess range of motion  - Assess patient's mobility; develop plan if impaired  - Assess patient's need for assistive devices and provide as appropriate  - Encourage maximum independence but intervene and supervise when necessary  - Involve family in performance of ADLs  - Assess for home care needs following discharge   - Consider OT consult to assist with ADL evaluation and planning for discharge  - Provide patient education as appropriate  Outcome: Progressing  Goal: Maintains/Returns to pre admission functional level  Description: INTERVENTIONS:  - Perform BMAT or MOVE assessment daily    - Set and communicate daily mobility goal to care team and patient/family/caregiver  - Collaborate with rehabilitation services on mobility goals if consulted  - Perform Range of Motion 3 times a day  - Reposition patient every 2 hours    - Dangle patient 3 times a day  - Stand patient 3 times a day  - Ambulate patient 3 times a day  - Out of bed to chair 3 times a day   - Out of bed for meals 3 times a day  - Out of bed for toileting  - Record patient progress and toleration of activity level   Outcome: Progressing

## 2022-12-22 NOTE — ASSESSMENT & PLAN NOTE
2-day history of dizziness-only noticed when she rolled over in bed  Was able to go to the gym that day without any problem  Since yesterday however dizziness worse sitting up and finding it difficult to ambulate as well  Denies nausea and vomiting  Granddaughter has cough but patient without any URI symptoms  Or otalgia or use of any new medications  CT negative for any acute pathology  We will treat as benign positional vertigo with meclizine and fluids

## 2022-12-22 NOTE — H&P
[facility]  H&P- Tenisha Ibrahim 1965, 65 y o  female MRN: [id number]  Unit/Bed#: ED-24 Encounter: [id number]  Primary Care Provider: Reagan Melendez DO   Date and time admitted to hospital: 12/22/2022  9:38 AM    * Benign positional vertigo  Assessment & Plan  2-day history of dizziness-only noticed when she rolled over in bed  Was able to go to the gym that day without any problem  Since yesterday however dizziness worse sitting up and finding it difficult to ambulate as well  Denies nausea and vomiting  Granddaughter has cough but patient without any URI symptoms  Or otalgia or use of any new medications  CT negative for any acute pathology  We will treat as benign positional vertigo with meclizine and fluids  DCIS (ductal carcinoma in situ) of breast  Assessment & Plan  History of breast cancer status postsurgery and radiation in remission    Hypercholesteremia  Assessment & Plan  History of dyslipidemia-not on meds currently      History of Present Illness     HPI:  Karin Julio is a 65 y o  female w/ h/o breast CA (in remission) and HLD presenting for dizziness started 2d ago, patient noted dizziness while rolling from the supine to the lateral position in bed but was able to go to the gym without any problems  Yesterday noticed dizziness upon getting up from a supine position as well as changing head position  Associated intermittent frontal dull HA, worse at night, relieved with tylenol, and nausea  Denies vision changeshead trauma, confusion, weakness, numbness or hearing loss    Unsteady gait secondary to the dizziness        Historical Information   Past Medical History:   Diagnosis Date   • BRCA1 negative    • BRCA2 negative    • [facility]    Right   • Cancer ([facility] ) 5/08   • History of radiation therapy 2008   • Mitral valve prolapse      Patient Active Problem List   Diagnosis   • DCIS (ductal carcinoma in situ) of breast   • Hypercholesteremia   • Benign positional vertigo     Past Surgical History:   Procedure Laterality Date   • BREAST BIOPSY Right 2008    Stereo   •  SECTION     • MASTECTOMY Right 2008       Social History   Social History     Substance and Sexual Activity   Alcohol Use Yes    Comment: weekly      Social History     Substance and Sexual Activity   Drug Use Never     Social History     Tobacco Use   Smoking Status Never   Smokeless Tobacco Never       Family History:   Family History   Problem Relation Age of Onset   • Breast cancer Mother 47   • Diabetes Father    • No Known Problems Daughter    • No Known Problems Maternal Grandmother    • No Known Problems Maternal Grandfather    • No Known Problems Paternal Grandmother    • No Known Problems Paternal Grandfather    • No Known Problems Daughter    • No Known Problems Daughter    • No Known Problems Daughter        Meds/Allergies       Current Facility-Administered Medications:   •  diazepam (VALIUM) injection 2 5 mg, 2 5 mg, Intravenous, Once, Sean Marquez MD  •  ketorolac (TORADOL) injection 15 mg, 15 mg, Intravenous, Once, Sean Marquez MD  •  sodium chloride 0 9 % bolus 500 mL, 500 mL, Intravenous, Once, Sean Mraquez MD    Current Outpatient Medications:   •  atorvastatin (LIPITOR) 10 mg tablet, Take by mouth (Patient not taking: Reported on 2022), Disp: , Rfl:     Allergies   Allergen Reactions   • Sulfa Antibiotics Rash       Review of Systems  A detailed 12 point review of systems was conducted and is negative apart from those mentioned in the HPI  Objective   Vitals: Blood pressure 165/76, pulse 67, temperature 97 5 °F (36 4 °C), temperature source Oral, resp  rate 18, height 5' 2" (1 575 m), last menstrual period 2019, SpO2 94 %  Physical Exam   HEENT: PERRLA, EOMI, sclera anicteric, dry mucous membranes, tongue mucosa dry without lesions  Horizontal nystagmus on lateral flexion  Neck: supple, no JVD, lymphadenopathy, thyromegaly    Heart: Regular rate and rhythm, S1S2 present  No murmur, rub or gallop  Lungs; Clear to auscultation bilaterally  No wheezing, crackles or rhonchi  No accessory muscle use or respiratory distress  Abdomen: soft, non-tender, non-distended, NABS  No guarding or rebound  No peritoneal sound or mass  Extremities: no clubbing, cyanosis, or edema  2+ pedal pulses bilaterally  Full range of motion  Neurologic:  Cranial nerves II-XII intact  Strength and sensation globally intact  Speech fluent and goal directed  Awake, alert and oriented x 3  Skin: warm and dry  No petechiae, purpura or rash  Lab Results: I have personally reviewed pertinent films in PACS            Imaging: CT brain NAD      Code Status: No Order      Counseling / Coordination of Care  Total floor / unit time spent today 22 minutes  Greater than 50% of total time was spent with the patient and / or family counseling and / or coordination of care  Portions of the record may have been created with voice recognition software  Occasional wrong word or "sound a like" substitutions may have occurred due to the inherent limitations of voice recognition software  Read the chart carefully and recognize, using context, where substitutions have occurred

## 2022-12-22 NOTE — TELEPHONE ENCOUNTER
Please go to the emergency room for evaluation   Reason for Disposition  • Lightheadedness (dizziness) present now, after 2 hours of rest and fluids    Answer Assessment - Initial Assessment Questions  1  DESCRIPTION: "Describe your dizziness "      When I roll over I get a dizzy, then Now I have a dull headache  2  LIGHTHEADED: "Do you feel lightheaded?" (e g , somewhat faint, woozy, weak upon standing)     "Lightheaded"   3  VERTIGO: "Do you feel like either you or the room is spinning or tilting?" (i e  vertigo)   "room in spinning"   4  SEVERITY: "How bad is it?"  "Do you feel like you are going to faint?" "Can you stand and walk?"   moderate to severe  5  ONSET:  "When did the dizziness begin?"    2 days ago   6  AGGRAVATING FACTORS: "Does anything make it worse?" (e g , standing, change in head position)    unknown  7  HEART RATE: "Can you tell me your heart rate?" "How many beats in 15 seconds?"  (Note: not all patients can do this)        Unknown   8  CAUSE: "What do you think is causing the dizziness?"     Unknown   9  RECURRENT SYMPTOM: "Have you had dizziness before?" If Yes, ask: "When was the last time?" "What happened that time?"     1 time before I got dizzy after spinning around in a chair"   10   OTHER SYMPTOMS: "Do you have any other symptoms?" (e g , fever, chest pain, vomiting, diarrhea, bleeding)     Denies    Protocols used: [id number]-PT

## 2022-12-22 NOTE — ED PROVIDER NOTES
History  Chief Complaint   Patient presents with   • Headache     Pt presents with dizziness and 4/10 dull headache x 2 days  No pain meds today  Hx tinnitus  Denies CP/SOB  Denies vision changes      63yo F w/ h/o breast CA (in remission) and HLD presenting for dizziness  2d ago, Pt developed sudden onset dizziness worse with movement or sudden position changes  Associated intermittent frontal dull HA, worse at night, relieved with tylenol, and nausea  Denies vision changes, use of space heater, hearing changes, F/C, recent head trauma, confusion, weakness, numbness  History provided by:  Patient      Prior to Admission Medications   Prescriptions Last Dose Informant Patient Reported? Taking?   atorvastatin (LIPITOR) 10 mg tablet   Yes No   Sig: Take by mouth   Patient not taking: Reported on 2022      Facility-Administered Medications: None       Past Medical History:   Diagnosis Date   • BRCA1 negative    • BRCA2 negative    • [facility] 2008    Right   [facility]    • History of radiation therapy    • Mitral valve prolapse        Past Surgical History:   Procedure Laterality Date   • BREAST BIOPSY Right 2008    Stereo   •  SECTION     • MASTECTOMY Right 2008       Family History   Problem Relation Age of Onset   • Breast cancer Mother 47   • Diabetes Father    • No Known Problems Daughter    • No Known Problems Maternal Grandmother    • No Known Problems Maternal Grandfather    • No Known Problems Paternal Grandmother    • No Known Problems Paternal Grandfather    • No Known Problems Daughter    • No Known Problems Daughter    • No Known Problems Daughter      I have reviewed and agree with the history as documented      E-Cigarette/Vaping   • E-Cigarette Use Never User      E-Cigarette/Vaping Substances   • Nicotine No    • THC No    • CBD No    • Flavoring No    • Other No    • Unknown No      Social History     Tobacco Use   • Smoking status: Never   • Smokeless tobacco: Never   Vaping Use   • Vaping Use: Never used   Substance Use Topics   • Alcohol use: Yes     Comment: weekly    • Drug use: Never        Review of Systems   Constitutional: Negative  HENT: Negative  Eyes: Negative  Respiratory: Negative  Cardiovascular: Negative  Gastrointestinal: Positive for nausea  Genitourinary: Negative  Musculoskeletal: Negative  Skin: Negative  Neurological: Positive for dizziness and headaches  Negative for weakness and numbness  Psychiatric/Behavioral: Negative  Physical Exam  ED Triage Vitals [12/22/22 0944]   Temperature Pulse Respirations Blood Pressure SpO2   97 5 °F (36 4 °C) 67 18 165/76 94 %      Temp Source Heart Rate Source Patient Position - Orthostatic VS BP Location FiO2 (%)   Oral Monitor Sitting Right arm --      Pain Score       4             Orthostatic Vital Signs  Vitals:    12/22/22 0944 12/22/22 1250 12/22/22 1300   BP: 165/76 137/64 135/63   Pulse: 67 56 61   Patient Position - Orthostatic VS: Sitting Lying        Physical Exam  Constitutional:       General: She is not in acute distress  Appearance: Normal appearance  HENT:      Head: Normocephalic and atraumatic  Right Ear: External ear normal       Left Ear: External ear normal       Nose: Nose normal  No rhinorrhea  Mouth/Throat:      Mouth: Mucous membranes are moist       Pharynx: Oropharynx is clear  Eyes:      Extraocular Movements: Extraocular movements intact  Conjunctiva/sclera: Conjunctivae normal       Pupils: Pupils are equal, round, and reactive to light  Cardiovascular:      Rate and Rhythm: Normal rate and regular rhythm  Pulses: Normal pulses  Pulmonary:      Effort: Pulmonary effort is normal       Breath sounds: Normal breath sounds  Abdominal:      General: Abdomen is flat  Palpations: Abdomen is soft  Musculoskeletal:      Cervical back: Normal range of motion and neck supple     Skin:     General: Skin is warm and dry  Capillary Refill: Capillary refill takes less than 2 seconds  Neurological:      General: No focal deficit present  Mental Status: She is alert and oriented to person, place, and time  Cranial Nerves: No cranial nerve deficit  Sensory: No sensory deficit  Motor: No weakness        Comments: Romberg positive   Psychiatric:         Mood and Affect: Mood normal          Behavior: Behavior normal          ED Medications  Medications   acetaminophen (TYLENOL) tablet 650 mg (has no administration in time range)   senna (SENOKOT) tablet 8 6 mg (has no administration in time range)   ondansetron (ZOFRAN) injection 4 mg (has no administration in time range)   enoxaparin (LOVENOX) subcutaneous injection 40 mg (has no administration in time range)   meclizine (ANTIVERT) tablet 25 mg (has no administration in time range)   diazepam (VALIUM) injection 2 5 mg (has no administration in time range)   meclizine (ANTIVERT) tablet 25 mg (25 mg Oral Given 12/22/22 1011)   ondansetron (ZOFRAN-ODT) dispersible tablet 4 mg (4 mg Oral Given 12/22/22 1011)   diazepam (VALIUM) tablet 5 mg (5 mg Oral Given 12/22/22 1115)   diazepam (VALIUM) injection 2 5 mg (2 5 mg Intravenous Given 12/22/22 1250)   ketorolac (TORADOL) injection 15 mg (15 mg Intravenous Given 12/22/22 1249)       Diagnostic Studies  Results Reviewed     Procedure Component Value Units Date/Time    Comprehensive metabolic panel [[id number]] Collected: 12/22/22 1247    Lab Status: Final result Specimen: Blood from Arm, Left Updated: 12/22/22 1326     Sodium 138 mmol/L      Potassium 4 2 mmol/L      Chloride 104 mmol/L      CO2 30 mmol/L      ANION GAP 4 mmol/L      BUN 14 mg/dL      Creatinine 0 62 mg/dL      Glucose 90 mg/dL      Glucose, Fasting 90 mg/dL      Calcium 8 9 mg/dL      AST 21 U/L      ALT 16 U/L      Alkaline Phosphatase 101 U/L      Total Protein 7 3 g/dL      Albumin 4 1 g/dL      Total Bilirubin 0 39 mg/dL      eGFR 101 ml/min/1 73sq m     Narrative:      National Kidney Disease Foundation guidelines for Chronic Kidney Disease (CKD):   •  Stage 1 with normal or high GFR (GFR > 90 mL/min/1 73 square meters)  •  Stage 2 Mild CKD (GFR = 60-89 mL/min/1 73 square meters)  •  Stage 3A Moderate CKD (GFR = 45-59 mL/min/1 73 square meters)  •  Stage 3B Moderate CKD (GFR = 30-44 mL/min/1 73 square meters)  •  Stage 4 Severe CKD (GFR = 15-29 mL/min/1 73 square meters)  •  Stage 5 End Stage CKD (GFR <15 mL/min/1 73 square meters)  Note: GFR calculation is accurate only with a steady state creatinine    CBC and differential [[id number]]  (Abnormal) Collected: 12/22/22 1247    Lab Status: Final result Specimen: Blood from Arm, Left Updated: 12/22/22 1307     WBC 7 00 Thousand/uL      RBC 5 02 Million/uL      Hemoglobin 14 1 g/dL      Hematocrit 42 9 %      MCV 86 fL      MCH 28 1 pg      MCHC 32 9 g/dL      RDW 15 4 %      MPV 10 1 fL      Platelets 262 Thousands/uL      nRBC 0 /100 WBCs      Neutrophils Relative 59 %      Immat GRANS % 0 %      Lymphocytes Relative 30 %      Monocytes Relative 8 %      Eosinophils Relative 2 %      Basophils Relative 1 %      Neutrophils Absolute 4 15 Thousands/µL      Immature Grans Absolute 0 03 Thousand/uL      Lymphocytes Absolute 2 07 Thousands/µL      Monocytes Absolute 0 55 Thousand/µL      Eosinophils Absolute 0 13 Thousand/µL      Basophils Absolute 0 07 Thousands/µL     Platelet count [[id number]]     Lab Status: No result Specimen: Blood                  CT head without contrast   Final Result by Omero Dasilva MD (12/22 1418)      No acute intracranial abnormality  Workstation performed: [id number]               Procedures  Procedures      ED Course  ED Course as of 12/22/22 1335   Thu Dec 22, 2022   4344 Ddx includes but not limited to intracranial pathology, vertigo, CO poisoning, migraine  1117 Pt still endorsing dizziness, despite antivert administration   Will trial valium and reassess Pt's sx  No longer complaining of nausea  1118 CT w/o evidence of intracranial pathology  1153 Pt still endorsing dizziness and unable to ambulate properly  Will admit for further w/u and management of her dizziness  SBIRT 22yo+    Flowsheet Row Most Recent Value   SBIRT (23 yo +)    In order to provide better care to our patients, we are screening all of our patients for alcohol and drug use  Would it be okay to ask you these screening questions? No Filed at: 12/22/2022 7561                MDM  Number of Diagnoses or Management Options      Disposition  Final diagnoses:   Vertigo   Dizziness     Time reflects when diagnosis was documented in both MDM as applicable and the Disposition within this note     Time User Action Codes Description Comment    12/22/2022 10:22 AM Elham James Add [R42] Vertigo     12/22/2022 11:53 AM Elham James Add [R42] Dizziness       ED Disposition     ED Disposition   Admit    Condition   Stable    Date/Time   Thu Dec 22, 2022 12:57 PM    Comment   Case was discussed with dr Vincent Srivastava and the patient's admission status was agreed to be Admission Status: observation status to the service of Dr Vincent Srivastava             Follow-up Information    None         Patient's Medications   Discharge Prescriptions    No medications on file     No discharge procedures on file  PDMP Review     None           ED Provider  Attending physically available and evaluated Stephenie Mccurdy I managed the patient along with the ED Attending      Electronically Signed by         Lolis Fischer MD  12/22/22 7944

## 2022-12-23 VITALS
RESPIRATION RATE: 16 BRPM | SYSTOLIC BLOOD PRESSURE: 138 MMHG | HEART RATE: 69 BPM | BODY MASS INDEX: 42.07 KG/M2 | WEIGHT: 228.62 LBS | OXYGEN SATURATION: 93 % | HEIGHT: 62 IN | TEMPERATURE: 99 F | DIASTOLIC BLOOD PRESSURE: 66 MMHG

## 2022-12-23 PROBLEM — E66.01 MORBID OBESITY (HCC): Status: ACTIVE | Noted: 2022-12-23

## 2022-12-23 PROBLEM — R42 VERTIGO: Status: ACTIVE | Noted: 2022-12-22

## 2022-12-23 LAB
ANION GAP SERPL CALCULATED.3IONS-SCNC: 5 MMOL/L (ref 4–13)
BUN SERPL-MCNC: 17 MG/DL (ref 5–25)
CALCIUM SERPL-MCNC: 8.6 MG/DL (ref 8.4–10.2)
CHLORIDE SERPL-SCNC: 107 MMOL/L (ref 96–108)
CO2 SERPL-SCNC: 28 MMOL/L (ref 21–32)
CREAT SERPL-MCNC: 0.65 MG/DL (ref 0.6–1.3)
GFR SERPL CREATININE-BSD FRML MDRD: 99 ML/MIN/1.73SQ M
GLUCOSE SERPL-MCNC: 97 MG/DL (ref 65–140)
POTASSIUM SERPL-SCNC: 4 MMOL/L (ref 3.5–5.3)
SODIUM SERPL-SCNC: 140 MMOL/L (ref 135–147)

## 2022-12-23 RX ORDER — MECLIZINE HYDROCHLORIDE 25 MG/1
25 TABLET ORAL EVERY 6 HOURS
Status: DISCONTINUED | OUTPATIENT
Start: 2022-12-23 | End: 2022-12-23 | Stop reason: HOSPADM

## 2022-12-23 RX ORDER — KETOROLAC TROMETHAMINE 30 MG/ML
15 INJECTION, SOLUTION INTRAMUSCULAR; INTRAVENOUS EVERY 6 HOURS
Status: DISCONTINUED | OUTPATIENT
Start: 2022-12-23 | End: 2022-12-23 | Stop reason: HOSPADM

## 2022-12-23 RX ORDER — IBUPROFEN 200 MG
400 TABLET ORAL EVERY 6 HOURS PRN
Start: 2022-12-23

## 2022-12-23 RX ORDER — MECLIZINE HYDROCHLORIDE 25 MG/1
25 TABLET ORAL EVERY 8 HOURS PRN
Qty: 15 TABLET | Refills: 0 | Status: SHIPPED | OUTPATIENT
Start: 2022-12-23

## 2022-12-23 RX ORDER — DIAZEPAM 5 MG/ML
5 INJECTION, SOLUTION INTRAMUSCULAR; INTRAVENOUS EVERY 12 HOURS PRN
Status: DISCONTINUED | OUTPATIENT
Start: 2022-12-23 | End: 2022-12-23

## 2022-12-23 RX ORDER — MAGNESIUM SULFATE 1 G/100ML
1 INJECTION INTRAVENOUS ONCE
Status: COMPLETED | OUTPATIENT
Start: 2022-12-23 | End: 2022-12-23

## 2022-12-23 RX ADMIN — MECLIZINE HYDROCHLORIDE 25 MG: 25 TABLET ORAL at 05:16

## 2022-12-23 RX ADMIN — KETOROLAC TROMETHAMINE 15 MG: 30 INJECTION, SOLUTION INTRAMUSCULAR; INTRAVENOUS at 10:30

## 2022-12-23 RX ADMIN — MECLIZINE HYDROCHLORIDE 25 MG: 25 TABLET ORAL at 12:09

## 2022-12-23 RX ADMIN — DIAZEPAM 2.5 MG: 10 INJECTION, SOLUTION INTRAMUSCULAR; INTRAVENOUS at 04:28

## 2022-12-23 RX ADMIN — MAGNESIUM SULFATE HEPTAHYDRATE 1 G: 1 INJECTION, SOLUTION INTRAVENOUS at 10:31

## 2022-12-23 RX ADMIN — ACETAMINOPHEN 650 MG: 325 TABLET ORAL at 04:27

## 2022-12-23 RX ADMIN — ENOXAPARIN SODIUM 40 MG: 40 INJECTION SUBCUTANEOUS at 08:44

## 2022-12-23 RX ADMIN — SODIUM CHLORIDE 1000 ML: 0.9 INJECTION, SOLUTION INTRAVENOUS at 10:31

## 2022-12-23 NOTE — DISCHARGE INSTR - AVS FIRST PAGE
Dear Oliva Lamar,     It was our pleasure to care for you here at [facility]  It is our hope that we were always able to exceed the expected standards for your care during your stay  You were hospitalized due to vertigo  You were cared for on the 3rd floor by Kathryn Moon PA-C under the service of Bree Bond MD with the [facility] who covers for your primary care physician (PCP), Obie Gibbons DO, while you were hospitalized  If you have any questions or concerns related to this hospitalization, you may contact us at [phone]  For follow up as well as any medication refills, we recommend that you follow up with your primary care physician  A registered nurse will reach out to you by phone within a few days after your discharge to answer any additional questions that you may have after going home  However, at this time we provide for you here, the most important instructions / recommendations at discharge:     Notable Medication Adjustments -   Take meclizine 25 mg every 6 hrs as needed for dizziness/vertigo  Take motrin as needed for headache  Testing Required after Discharge -   None  Important follow up information -   Follow up with PCP  If recurs, consider Ear, Nose, Throat evaluation  Other Instructions -   Drink plenty of fluids  Rest and enjoy the holiday! Please review this entire after visit summary as additional general instructions including medication list, appointments, activity, diet, any pertinent wound care, and other additional recommendations from your care team that may be provided for you        Sincerely,   Kathryn Moon PA-C

## 2022-12-23 NOTE — PLAN OF CARE
Problem: SAFETY ADULT  Goal: Patient will remain free of falls  Description: INTERVENTIONS:  - Educate patient/family on patient safety including physical limitations  - Instruct patient to call for assistance with activity   - Consult OT/PT to assist with strengthening/mobility   - Keep Call bell within reach  - Keep bed low and locked with side rails adjusted as appropriate  - Keep care items and personal belongings within reach  - Initiate and maintain comfort rounds  - Make Fall Risk Sign visible to staff  - Offer Toileting every  Hours, in advance of need  - Initiate/Maintain alarm  - Obtain necessary fall risk management equipment:  - Apply yellow socks and bracelet for high fall risk patients  - Consider moving patient to room near nurses station  Outcome: Progressing  Goal: Maintain or return to baseline ADL function  Description: INTERVENTIONS:  -  Assess patient's ability to carry out ADLs; assess patient's baseline for ADL function and identify physical deficits which impact ability to perform ADLs (bathing, care of mouth/teeth, toileting, grooming, dressing, etc )  - Assess/evaluate cause of self-care deficits   - Assess range of motion  - Assess patient's mobility; develop plan if impaired  - Assess patient's need for assistive devices and provide as appropriate  - Encourage maximum independence but intervene and supervise when necessary  - Involve family in performance of ADLs  - Assess for home care needs following discharge   - Consider OT consult to assist with ADL evaluation and planning for discharge  - Provide patient education as appropriate  Outcome: Progressing  Goal: Maintains/Returns to pre admission functional level  Description: INTERVENTIONS:  - Perform BMAT or MOVE assessment daily    - Set and communicate daily mobility goal to care team and patient/family/caregiver  - Collaborate with rehabilitation services on mobility goals if consulted  - Perform Range of Motion  times a day    - Reposition patient every  hours    - Dangle patient  times a day  - Stand patient  times a day  - Ambulate patient  times a day  - Out of bed to chair  times a day   - Out of bed for meals  times a day  - Out of bed for toileting  - Record patient progress and toleration of activity level   Outcome: Progressing

## 2022-12-23 NOTE — UTILIZATION REVIEW
Initial Clinical Review    Admission: Date/Time/Statement:   Admission Orders (From admission, onward)     Ordered        12/22/22 1233  Place in Observation  Once                      Orders Placed This Encounter   Procedures   • Place in Observation     Standing Status:   Standing     Number of Occurrences:   1     Order Specific Question:   Level of Care     Answer:   Med Surg [16]     ED Arrival Information     Expected   -    Arrival   12/22/2022 09:28    Acuity   Urgent            Means of arrival   Walk-In    Escorted by   Self    Service   Hospitalist    Admission type   Emergency            Arrival complaint   Dizziness/headache           Chief Complaint   Patient presents with   • Headache     Pt presents with dizziness and 4/10 dull headache x 2 days  No pain meds today  Hx tinnitus  Denies CP/SOB  Denies vision changes        Initial Presentation: 65 y o  female w/ h/o breast CA (in remission) and HLD presenting for dizziness started 2d ago, patient noted dizziness while rolling from the supine to the lateral position in bed but was able to go to the gym without any problems  Yesterday noticed dizziness upon getting up from a supine position as well as changing head position  Associated intermittent frontal dull HA, worse at night, relieved with tylenol, and nausea  Unsteady gait secondary to the dizziness  Plan: Observation for benign positional vertigo: IV fluids, meclizine         ED Triage Vitals [12/22/22 0944]   Temperature Pulse Respirations Blood Pressure SpO2   97 5 °F (36 4 °C) 67 18 165/76 94 %      Temp Source Heart Rate Source Patient Position - Orthostatic VS BP Location FiO2 (%)   Oral Monitor Sitting Right arm --      Pain Score       4          Wt Readings from Last 1 Encounters:   12/22/22 104 kg (228 lb 9 9 oz)     Additional Vital Signs:     Date/Time Temp Pulse Resp BP MAP (mmHg) SpO2 O2 Device   12/23/22 0700 98 8 °F (37 1 °C) 70 18 135/60 86 93 % None (Room air)   12/23/22 04:21:44 98 7 °F (37 1 °C) -- -- -- -- -- --   12/22/22 1458 -- -- -- 134/64 -- -- --   12/22/22 14:52:24 97 7 °F (36 5 °C) 61 18 -- -- 96 % --   12/22/22 14:51:52 -- 66 -- -- -- 96 % --   12/22/22 1300 -- 61 -- 135/63 90 97 % --   12/22/22 1250 -- 56 18 137/64 92 98 % None (Room air)     Pertinent Labs/Diagnostic Test Results:   CT head without contrast   Final Result by Syd Minor MD (12/22 1048)      No acute intracranial abnormality                    Workstation performed: [id number]               Results from last 7 days   Lab Units 12/22/22  1247   WBC Thousand/uL 7 00   HEMOGLOBIN g/dL 14 1   HEMATOCRIT % 42 9   PLATELETS Thousands/uL 330   NEUTROS ABS Thousands/µL 4 15         Results from last 7 days   Lab Units 12/23/22  0437 12/22/22  1247   SODIUM mmol/L 140 138   POTASSIUM mmol/L 4 0 4 2   CHLORIDE mmol/L 107 104   CO2 mmol/L 28 30   ANION GAP mmol/L 5 4   BUN mg/dL 17 14   CREATININE mg/dL 0 65 0 62   EGFR ml/min/1 73sq m 99 101   CALCIUM mg/dL 8 6 8 9     Results from last 7 days   Lab Units 12/22/22  1247   AST U/L 21   ALT U/L 16   ALK PHOS U/L 101   TOTAL PROTEIN g/dL 7 3   ALBUMIN g/dL 4 1   TOTAL BILIRUBIN mg/dL 0 39         Results from last 7 days   Lab Units 12/23/22  0437 12/22/22  1247   GLUCOSE RANDOM mg/dL 97 90         ED Treatment:   Medication Administration from 12/22/2022 0910 to 12/22/2022 1445       Date/Time Order Dose Route Action     12/22/2022 1011 EST meclizine (ANTIVERT) tablet 25 mg 25 mg Oral Given     12/22/2022 1011 EST ondansetron (ZOFRAN-ODT) dispersible tablet 4 mg 4 mg Oral Given     12/22/2022 1115 EST diazepam (VALIUM) tablet 5 mg 5 mg Oral Given     12/22/2022 1249 EST sodium chloride 0 9 % bolus 500 mL 500 mL Intravenous New Bag     12/22/2022 1250 EST diazepam (VALIUM) injection 2 5 mg 2 5 mg Intravenous Given     12/22/2022 1249 EST ketorolac (TORADOL) injection 15 mg 15 mg Intravenous Given        Past Medical History:   Diagnosis Date   • BRCA1 negative    • BRCA2 negative    • Breast cancer ([facility] ) 06/2008    Right   • Cancer ([facility] ) 5/08   • History of radiation therapy 2008   • Mitral valve prolapse      Present on Admission:  • Benign positional vertigo  • DCIS (ductal carcinoma in situ) of breast  • Hypercholesteremia      Admitting Diagnosis: Dizziness [R42]  Vertigo [R42]  Age/Sex: 65 y o  female  Admission Orders:  Scheduled Medications:  enoxaparin, 40 mg, Subcutaneous, Daily  meclizine, 25 mg, Oral, Q8H NEELA  senna, 1 tablet, Oral, Daily      Continuous IV Infusions:     PRN Meds:  acetaminophen, 650 mg, Oral, Q6H PRN  diazepam, 2 5 mg, Intravenous, Q8H PRN  ondansetron, 4 mg, Intravenous, Q6H PRN        None    Network Utilization Review Department  ATTENTION: Please call with any questions or concerns to [phone] and carefully listen to the prompts so that you are directed to the right person  All voicemails are confidential   Vandana Henson all requests for admission clinical reviews, approved or denied determinations and any other requests to dedicated fax number below belonging to the campus where the patient is receiving treatment   List of dedicated fax numbers for the Facilities:  [address] DENIALS (Administrative/Medical Necessity) [phone]   [address] (Maternity/NICU/Pediatrics) [phone]   3 Aleksandra Quinonez [phone]   [facility] [address] [phone]   [address] [place] [address] Dyan Escobedo 28 [phone]   [facility] Sandro Solano [facility] 134 815 [facility] [phone]

## 2022-12-23 NOTE — ASSESSMENT & PLAN NOTE
· Patient presented to the emergency department with 2 days of vertiginous symptoms worse with movement to the head to the left  · Status post meclizine, Valium, IV fluids  · Suspect BPPV as etiology  Cannot rule out vestibular migraine given association with headache  · CT of the head negative on admission    · No focal neurologic deficits on exam  · Recommend outpatient follow-up with PCP and ENT if this were to recur  · Discharged with as needed meclizine

## 2022-12-23 NOTE — DISCHARGE SUMMARY
[facility]  Discharge- Gui Ibrahim 1965, 65 y o  female MRN: [id number]  Unit/Bed#: S -01 Encounter: [id number]  Primary Care Provider: Audrey Wilson DO   Date and time admitted to hospital: 12/22/2022  9:38 AM    * Vertigo  Assessment & Plan  · Patient presented to the emergency department with 2 days of vertiginous symptoms worse with movement to the head to the left  · Status post meclizine, Valium, IV fluids  · Suspect BPPV as etiology  Cannot rule out vestibular migraine given association with headache  · CT of the head negative on admission  · No focal neurologic deficits on exam  · Recommend outpatient follow-up with PCP and ENT if this were to recur  · Discharged with as needed meclizine    Hypercholesteremia  Assessment & Plan  · History of dyslipidemia   · No longer taking any statin    Morbid obesity ([facility] [address] )  Assessment & Plan  · Body mass index is 41 81 kg/m²  · Recommend dietary and lifestyle modifications    DCIS (ductal carcinoma in situ) of breast  Assessment & Plan  · History of breast cancer status postsurgery and radiation in remission    Medical Problems     Resolved Problems  Date Reviewed: 12/23/2022   None       Discharging Physician / Practitioner: Yola James PA-C  PCP: Audrey Wilson DO  Admission Date:   Admission Orders (From admission, onward)     Ordered        12/22/22 1233  Place in Observation  Once                      Discharge Date: 12/23/22    Consultations During Hospital Stay:  · none    Procedures Performed:   · none    Significant Findings / Test Results:   · CT head: No acute intracranial abnormality  Incidental Findings:   · none     Test Results Pending at Discharge (will require follow up):   · none     Outpatient Tests Requested:  · none    Complications:  none    Reason for Admission: vertigo    Hospital Course:   Khushi Clay is a 65 y o  female patient who originally presented to the hospital on 12/22/2022 due to dizziness  The patient was laying in bed when she developed the sudden onset she was spinning  Has past medical history of breast cancer status post radiation and hyperlipidemia  Her dizziness was noted to be worse with sudden positional changes especially with head movement to the left  It was associated with a dull frontal headache  Patient was felt to likely have BPPV  She was noted to be started on meclizine and Valium  She was given IV fluid resuscitation  Her orthostatic vital signs were negative  Patient symptoms were noted to improve with the use of scheduled meclizine and as needed Valium  She was noted to be subsequently discharged with as needed meclizine upon discharge and recommended for outpatient follow-up with PCP and if this recurs to follow-up with ENT  Please see above list of diagnoses and related plan for additional information  Condition at Discharge: stable    Discharge Day Visit / Exam:   Subjective:  Feeling better - up and walking without dizziness or vertigo  Headache resolved  Can move head to the left without issue  Vitals: Blood Pressure: 138/66 (12/23/22 1519)  Pulse: 69 (12/23/22 1519)  Temperature: 99 °F (37 2 °C) (12/23/22 1519)  Temp Source: Oral (12/23/22 0700)  Respirations: 16 (12/23/22 1519)  Height: 5' 2" (157 5 cm) (12/22/22 1250)  Weight - Scale: 104 kg (228 lb 9 9 oz) (12/22/22 1250)  SpO2: 93 % (12/23/22 1519)  Exam:   Physical Exam  Vitals and nursing note reviewed  Constitutional:       General: She is not in acute distress  Appearance: Normal appearance  She is not diaphoretic  HENT:      Head: Normocephalic and atraumatic  Mouth/Throat:      Mouth: Mucous membranes are moist    Cardiovascular:      Rate and Rhythm: Normal rate and regular rhythm  Pulmonary:      Effort: Pulmonary effort is normal       Breath sounds: Normal breath sounds  No stridor  No wheezing, rhonchi or rales     Abdominal:      General: Bowel sounds are normal  Palpations: Abdomen is soft  There is no mass  Tenderness: There is no abdominal tenderness  There is no guarding  Musculoskeletal:      Right lower leg: No edema  Left lower leg: No edema  Skin:     General: Skin is warm and dry  Neurological:      Mental Status: She is alert  Comments: Awake, alert, oriented x3  Follows commands  Speech clear without dysarthria  Muscle strength symmetric  No nystagmus, strabismus or lid lag  Psychiatric:         Mood and Affect: Mood normal          Behavior: Behavior normal           Discussion with Family: Patient declined call to   Discharge instructions/Information to patient and family:   See after visit summary for information provided to patient and family  Provisions for Follow-Up Care:  See after visit summary for information related to follow-up care and any pertinent home health orders  Disposition:   Home    Planned Readmission: no     Discharge Statement:  I spent 50 minutes discharging the patient  This time was spent on the day of discharge  I had direct contact with the patient on the day of discharge  Greater than 50% of the total time was spent examining patient, answering all patient questions, arranging and discussing plan of care with patient as well as directly providing post-discharge instructions  Additional time then spent on discharge activities  Discharge Medications:  See after visit summary for reconciled discharge medications provided to patient and/or family        **Please Note: This note may have been constructed using a voice recognition system**

## 2022-12-23 NOTE — MALNUTRITION/BMI
This medical record reflects one or more clinical indicators suggestive of morbid obesity  360 Statement: related to energy intake exceeding energy expenditure over time as evidenced by BMI > 40  Encourage exercise, low fat diet  BMI Findings:  Adult BMI Classifications: Morbid Obesity 40-44 9        Body mass index is 41 81 kg/m²  See Nutrition note dated 12/23/2022 for additional details  Completed nutrition assessment is viewable in the nutrition documentation

## 2022-12-28 ENCOUNTER — TRANSITIONAL CARE MANAGEMENT (OUTPATIENT)
Dept: FAMILY MEDICINE CLINIC | Facility: CLINIC | Age: 57
End: 2022-12-28

## 2023-02-08 DIAGNOSIS — Z12.31 SCREENING MAMMOGRAM FOR BREAST CANCER: Primary | ICD-10-CM

## 2023-02-20 ENCOUNTER — TELEPHONE (OUTPATIENT)
Dept: FAMILY MEDICINE CLINIC | Facility: CLINIC | Age: 58
End: 2023-02-20

## 2023-02-20 DIAGNOSIS — E66.01 MORBID OBESITY (HCC): Primary | ICD-10-CM

## 2023-02-20 NOTE — TELEPHONE ENCOUNTER
----- Message from 1000 [facility] sent at 2/20/2023  9:49 AM EST -----  Regarding: Weight Management  Contact: [phone]  Good morning! I am interested in seeing at weight management doctor  Is there anyone particular you would recommend?    Thank you

## 2023-03-01 ENCOUNTER — OFFICE VISIT (OUTPATIENT)
Dept: FAMILY MEDICINE CLINIC | Facility: CLINIC | Age: 58
End: 2023-03-01

## 2023-03-01 VITALS
OXYGEN SATURATION: 98 % | BODY MASS INDEX: 43.06 KG/M2 | TEMPERATURE: 98.2 F | DIASTOLIC BLOOD PRESSURE: 72 MMHG | HEIGHT: 62 IN | WEIGHT: 234 LBS | SYSTOLIC BLOOD PRESSURE: 118 MMHG | HEART RATE: 78 BPM

## 2023-03-01 DIAGNOSIS — E66.01 MORBID OBESITY (HCC): ICD-10-CM

## 2023-03-01 DIAGNOSIS — H81.12 BENIGN PAROXYSMAL POSITIONAL VERTIGO OF LEFT EAR: Primary | ICD-10-CM

## 2023-03-01 NOTE — PROGRESS NOTES
Name: Kaylen Knowles      : 1965      MRN: [id number]  Encounter Provider: Fouzia Fan DO  Encounter Date: 3/1/2023   Encounter department: [facility] [place] [address]  Benign paroxysmal positional vertigo of left ear  Comments:  refer to vestibular rehab  call if no improvement  Orders:  -     Ambulatory Referral to Physical Therapy; Future    2  Morbid obesity ([address] )  Comments:  discussed options  pt interested in wegovy; will start same  pt to f/u with weight mgmt in may as scheduled  call for any problems  Orders:  -     Semaglutide-Weight Management (WEGOVY) 0 25 MG/0 5ML; Inject 0 5 mL (0 25 mg total) under the skin once a week           Subjective      HPI   Pt presents to discuss 2 problems  Pt has had vertigo which started when in December she rolled over in bed and sat up in bed which both caused dizziness  Eventually went to ED as it became intractable  Was treated and told to f/u  CT head nml  At this point, has daily dizziness when she turns left and is ready to do something  No hearing loss  No ear pain  No paresthesias or weakness  Pt is seeing weight mgmt in may  Has been exercising regularly and watching intake  Has been able to lose no weight and is getting extremely frustrated  No hx of DM  Is interested in starting glp-1 prior to getting into weight mgmt  No hx of pancreatitis or thyroid CA  No family hx of MEN    Review of Systems   Constitutional: Negative for chills, fatigue, fever and unexpected weight change  HENT: Negative for congestion, ear pain, hearing loss, postnasal drip, rhinorrhea, sinus pressure, sinus pain, sore throat, trouble swallowing and voice change  Eyes: Negative for pain, redness and visual disturbance  Respiratory: Negative for cough and shortness of breath  Cardiovascular: Negative for chest pain, palpitations and leg swelling     Gastrointestinal: Negative for abdominal pain, constipation, diarrhea and nausea  Endocrine: Negative for cold intolerance, heat intolerance, polydipsia and polyuria  Genitourinary: Negative for dysuria, frequency and urgency  Musculoskeletal: Negative for arthralgias, joint swelling and myalgias  Skin: Negative for rash  No suspicious lesions   Neurological: Positive for dizziness  Negative for weakness, numbness and headaches  Hematological: Negative for adenopathy  Current Outpatient Medications on File Prior to Visit   Medication Sig   • ibuprofen (MOTRIN) 200 mg tablet Take 2 tablets (400 mg total) by mouth every 6 (six) hours as needed for mild pain or moderate pain   • meclizine (ANTIVERT) 25 mg tablet Take 1 tablet (25 mg total) by mouth every 8 (eight) hours as needed for dizziness       Objective     /72 (BP Location: Left arm, Patient Position: Sitting, Cuff Size: Adult)   Pulse 78   Temp 98 2 °F (36 8 °C)   Ht 5' 2" (1 575 m)   Wt 106 kg (234 lb)   LMP 09/17/2019   SpO2 98%   BMI 42 80 kg/m²     Physical Exam  Constitutional:       General: She is not in acute distress  Appearance: She is well-developed  HENT:      Head: Normocephalic and atraumatic  Right Ear: Tympanic membrane, ear canal and external ear normal       Left Ear: Tympanic membrane, ear canal and external ear normal       Nose: Nose normal       Mouth/Throat:      Pharynx: No oropharyngeal exudate  Eyes:      Conjunctiva/sclera: Conjunctivae normal       Pupils: Pupils are equal, round, and reactive to light  Neck:      Thyroid: No thyromegaly  Vascular: No carotid bruit or JVD  Cardiovascular:      Rate and Rhythm: Regular rhythm  Heart sounds: S1 normal and S2 normal  No murmur heard  No friction rub  No gallop  No S3 or S4 sounds  Pulmonary:      Effort: Pulmonary effort is normal       Breath sounds: Normal breath sounds  No wheezing, rhonchi or rales  Abdominal:      General: Bowel sounds are normal  There is no distension  Palpations: Abdomen is soft  Tenderness: There is no abdominal tenderness  Lymphadenopathy:      Cervical: No cervical adenopathy  Neurological:      Mental Status: She is alert and oriented to person, place, and time  Cranial Nerves: No cranial nerve deficit  Sensory: No sensory deficit  Coordination: Coordination normal       Gait: Gait normal       Deep Tendon Reflexes: Reflexes are normal and symmetric   Reflexes normal       Comments: + galina hallyusef to [address], DO

## 2023-03-14 ENCOUNTER — HOSPITAL ENCOUNTER (OUTPATIENT)
Dept: MAMMOGRAPHY | Facility: HOSPITAL | Age: 58
Discharge: HOME | End: 2023-03-14

## 2023-03-14 VITALS — HEIGHT: 62 IN | BODY MASS INDEX: 43.06 KG/M2 | WEIGHT: 234 LBS

## 2023-03-14 DIAGNOSIS — Z12.31 SCREENING MAMMOGRAM FOR BREAST CANCER: ICD-10-CM

## 2023-03-29 ENCOUNTER — PATIENT MESSAGE (OUTPATIENT)
Dept: FAMILY MEDICINE CLINIC | Facility: CLINIC | Age: 58
End: 2023-03-29

## 2023-03-29 DIAGNOSIS — E66.01 MORBID OBESITY (HCC): Primary | ICD-10-CM

## 2023-04-28 ENCOUNTER — PROCEDURE VISIT (OUTPATIENT)
Dept: OBGYN CLINIC | Facility: CLINIC | Age: 58
End: 2023-04-28

## 2023-04-28 VITALS
WEIGHT: 221 LBS | BODY MASS INDEX: 40.67 KG/M2 | SYSTOLIC BLOOD PRESSURE: 122 MMHG | DIASTOLIC BLOOD PRESSURE: 84 MMHG | HEIGHT: 62 IN

## 2023-04-28 DIAGNOSIS — Z11.51 SCREENING FOR HPV (HUMAN PAPILLOMAVIRUS): ICD-10-CM

## 2023-04-28 DIAGNOSIS — N95.0 PMB (POSTMENOPAUSAL BLEEDING): Primary | ICD-10-CM

## 2023-04-28 DIAGNOSIS — Z12.4 ENCOUNTER FOR SCREENING FOR MALIGNANT NEOPLASM OF CERVIX: ICD-10-CM

## 2023-04-28 DIAGNOSIS — L29.3 PERINEAL ITCHING, FEMALE: ICD-10-CM

## 2023-04-28 RX ORDER — CLOBETASOL PROPIONATE 0.5 MG/G
OINTMENT TOPICAL 2 TIMES DAILY
Qty: 30 G | Refills: 0 | Status: SHIPPED | OUTPATIENT
Start: 2023-04-28

## 2023-04-30 PROBLEM — N95.0 PMB (POSTMENOPAUSAL BLEEDING): Status: ACTIVE | Noted: 2023-04-30

## 2023-04-30 PROBLEM — L29.3 PERINEAL ITCHING, FEMALE: Status: ACTIVE | Noted: 2023-04-30

## 2023-05-01 ENCOUNTER — PATIENT MESSAGE (OUTPATIENT)
Dept: FAMILY MEDICINE CLINIC | Facility: CLINIC | Age: 58
End: 2023-05-01

## 2023-05-01 DIAGNOSIS — E66.01 MORBID OBESITY (HCC): ICD-10-CM

## 2023-05-01 NOTE — PROGRESS NOTES
"Assessment/Plan:    No problem-specific Assessment & Plan notes found for this encounter  Diagnoses and all orders for this visit:    PMB (postmenopausal bleeding)  -     Tissue Exam  -     Follicle stimulating hormone; Future    Perineal itching, female  -     clobetasol (TEMOVATE) 0 05 % ointment; Apply topically 2 (two) times a day    Encounter for screening for malignant neoplasm of cervix  -     Liquid-based pap, screening    Screening for HPV (human papillomavirus)  -     Liquid-based pap, screening          Subjective:      Patient ID: Ute Liao is a 65 y o  female  HPI  54K [id number] presents with the complaint of vaginal bleeding  She has went 13 months without a period and now has light period like bleeding  The longest she has went without a period before this was 7 months  She denies cramping and pain  She denies significant hot flushes  She does have perineal itching  She has always had normal paps, but it has been 3-5yrs since her last   She has a history of breast cancer    The following portions of the patient's history were reviewed and updated as appropriate: allergies, current medications, past family history, past medical history, past social history, past surgical history and problem list     Review of Systems   Constitutional: Negative  HENT: Negative  Respiratory: Negative  Cardiovascular: Negative  Gastrointestinal: Negative  Genitourinary: Positive for vaginal bleeding  Negative for pelvic pain, vaginal discharge and vaginal pain  Musculoskeletal: Negative  Neurological: Negative  Psychiatric/Behavioral: Negative  Objective:      /84 (BP Location: Left arm, Patient Position: Sitting, Cuff Size: Standard)   Ht 5' 2\" (1 575 m)   Wt 100 kg (221 lb)   LMP 09/17/2019   BMI 40 42 kg/m²          Physical Exam  Vitals and nursing note reviewed  Constitutional:       Appearance: Normal appearance     HENT:      Head: Normocephalic and " atraumatic  Eyes:      Extraocular Movements: Extraocular movements intact  Conjunctiva/sclera: Conjunctivae normal       Pupils: Pupils are equal, round, and reactive to light  Pulmonary:      Effort: Pulmonary effort is normal    Abdominal:      General: There is no distension  Palpations: Abdomen is soft  Tenderness: There is no abdominal tenderness  Genitourinary:     Labia:         Right: Rash present  No tenderness, lesion or injury  Left: Rash present  No tenderness, lesion or injury  Vagina: Normal  No vaginal discharge or tenderness  Cervix: Cervical bleeding present  No discharge, lesion or erythema  Uterus: Normal        Adnexa: Right adnexa normal and left adnexa normal           Comments: Lichen like changes  Skin:     General: Skin is warm and dry  Neurological:      General: No focal deficit present  Mental Status: She is alert and oriented to person, place, and time  Psychiatric:         Mood and Affect: Mood normal          Behavior: Behavior normal            Endometrial biopsy    Date/Time: 4/28/2023 8:30 AM  Performed by: Paulette Castro MD  Authorized by: Paulette Castro MD   Universal Protocol:  Consent: Verbal consent obtained  Written consent obtained    Risks and benefits: risks, benefits and alternatives were discussed  Consent given by: patient  Patient understanding: patient states understanding of the procedure being performed  Patient consent: the patient's understanding of the procedure matches consent given  Procedure consent: procedure consent matches procedure scheduled  Required items: required blood products, implants, devices, and special equipment available  Patient identity confirmed: verbally with patient      Indication:     Indications: Post-menopausal bleeding      Chronicity of post-menopausal bleeding:  New    Progression of post-menopausal bleeding:  Unchanged  Procedure:     Procedure: endometrial biopsy with [place]      A bivalve speculum was placed in the vagina: yes      Cervix cleaned and prepped: yes      The cervix was dilated: no      Uterus sounded: yes      Uterus sound depth (cm):  8    Curettes used:  2    Specimen collected: specimen collected and sent to pathology      Patient tolerated procedure well with no complications: yes    Findings:     Uterus size:  Non-gravid    Cervix: normal

## 2023-05-02 ENCOUNTER — APPOINTMENT (OUTPATIENT)
Dept: LAB | Facility: AMBULARY SURGERY CENTER | Age: 58
End: 2023-05-02

## 2023-05-02 DIAGNOSIS — N95.0 PMB (POSTMENOPAUSAL BLEEDING): ICD-10-CM

## 2023-05-02 DIAGNOSIS — Z00.8 HEALTH EXAMINATION IN POPULATION SURVEY: ICD-10-CM

## 2023-05-02 DIAGNOSIS — Z00.00 PHYSICAL EXAM, ANNUAL: ICD-10-CM

## 2023-05-02 LAB
CHOLEST SERPL-MCNC: 209 MG/DL
FSH SERPL-ACNC: 44 MIU/ML
HDLC SERPL-MCNC: 57 MG/DL
LDLC SERPL CALC-MCNC: 134 MG/DL (ref 0–100)
NONHDLC SERPL-MCNC: 152 MG/DL
TRIGL SERPL-MCNC: 90 MG/DL

## 2023-05-03 LAB
EST. AVERAGE GLUCOSE BLD GHB EST-MCNC: 100 MG/DL
HBA1C MFR BLD: 5.1 %
HPV HR 12 DNA CVX QL NAA+PROBE: NEGATIVE
HPV16 DNA CVX QL NAA+PROBE: NEGATIVE
HPV18 DNA CVX QL NAA+PROBE: NEGATIVE

## 2023-05-05 LAB
LAB AP GYN PRIMARY INTERPRETATION: NORMAL
Lab: NORMAL

## 2023-05-09 NOTE — RESULT ENCOUNTER NOTE
Neg pap and HPV testing - continue routine testing  EMB - negative for hyperplasia and Dysplasia  FSH - in the menopausal range - but at the low end    Likely bleeding is still perimenopausal

## 2023-05-22 DIAGNOSIS — E66.01 MORBID OBESITY (HCC): ICD-10-CM

## 2023-06-21 DIAGNOSIS — E66.01 MORBID OBESITY (HCC): ICD-10-CM

## 2023-07-13 ENCOUNTER — PATIENT MESSAGE (OUTPATIENT)
Dept: FAMILY MEDICINE CLINIC | Facility: CLINIC | Age: 58
End: 2023-07-13

## 2023-07-13 DIAGNOSIS — E66.01 MORBID OBESITY (HCC): ICD-10-CM

## 2023-07-17 NOTE — TELEPHONE ENCOUNTER
Prior authorization for Wegovy 1.7mg submitted to patient's insurance through [employer]. com. Covermymeds. com key: [id number]

## 2023-07-24 ENCOUNTER — TELEPHONE (OUTPATIENT)
Dept: FAMILY MEDICINE CLINIC | Facility: CLINIC | Age: 58
End: 2023-07-24

## 2023-07-24 NOTE — TELEPHONE ENCOUNTER
Patient called- patient Nilda Logan that she is doing well has been denied patient is asking for us to do and appeal. Paper work is placed in Dr Cecy Palacios folder.

## 2023-07-26 NOTE — TELEPHONE ENCOUNTER
shes actually going to see weight mgmt on Friday. She has done well thus far, so let's see if weight mgmt wants to increase her or stay where she is. She had wanted to start this with me and continue it as part of a more structured program with them. Please let her know. I have no problem doing the prior-auth. She has done well.   I just am unsure what their recommendation will be, so they may take over

## 2023-07-26 NOTE — TELEPHONE ENCOUNTER
Please advise if appeal should be submitted for [facility]FORT ESPERANZA 1.7mg or if dose should be increased to 2.4mg.

## 2023-07-28 ENCOUNTER — CONSULT (OUTPATIENT)
Dept: BARIATRICS | Facility: CLINIC | Age: 58
End: 2023-07-28
Payer: COMMERCIAL

## 2023-07-28 VITALS
DIASTOLIC BLOOD PRESSURE: 80 MMHG | BODY MASS INDEX: 38.79 KG/M2 | SYSTOLIC BLOOD PRESSURE: 125 MMHG | WEIGHT: 210.8 LBS | RESPIRATION RATE: 16 BRPM | HEIGHT: 62 IN | HEART RATE: 79 BPM

## 2023-07-28 DIAGNOSIS — E66.9 OBESITY, CLASS II, BMI 35-39.9: Primary | ICD-10-CM

## 2023-07-28 DIAGNOSIS — E66.01 MORBID OBESITY (HCC): ICD-10-CM

## 2023-07-28 DIAGNOSIS — E78.00 HYPERCHOLESTEREMIA: ICD-10-CM

## 2023-07-28 PROCEDURE — 99244 OFF/OP CNSLTJ NEW/EST MOD 40: CPT | Performed by: NURSE PRACTITIONER

## 2023-07-28 RX ORDER — SEMAGLUTIDE 2.4 MG/.75ML
2.4 INJECTION, SOLUTION SUBCUTANEOUS WEEKLY
Qty: 3 ML | Refills: 3 | Status: SHIPPED | OUTPATIENT
Start: 2023-07-28

## 2023-07-28 NOTE — PROGRESS NOTES
Assessment/Plan:    Obesity, Class II, BMI 35-39.9  - Discussed options of HealthyCORE-Intensive Lifestyle Intervention Program, Very Low Calorie Diet-VLCD, Conservative Program, Kait-En-Y Gastric Bypass and Vertical Sleeve Gastrectomy and the role of weight loss medications.  - Not interested in weight loss surgery. - Patient is interested in pursuing Conservative Program  - Initial weight loss goal of 5-10% weight loss for improved health  - Weight loss can improve patient's co-morbid conditions and/or prevent weight-related complications. - S/P tubal ligation  - Patient denies personal history of pancreatitis. Patient also denies personal and family history of medullary thyroid cancer and multiple endocrine neoplasia type 2 (MEN 2 tumor). Sagrario Bundy started March 2023 through primary care at weight of 228 lbs. Has been on current dose of 1.7 mg weekly for 2 months, tolerating well and helping with appetite. Increase to goal dose of 2.4 mg weekly. - Side effects of Wegovy discussed: nausea, vomiting, diarrhea, and constipation. If severe abdominal pain develops, stop Wegovy and go to the ER, as this could be pancreatitis. Monitor heart rate while on Wegovy and if resting heart rate greater than 100 beats per minute, patient will notify me. - Stop Aidan Melchor 3/8  - Labs reviewed: A1C and lipid panel 5/2/2023. CMP and CBC 12/23/2022. TSH 9/14/2022. Chol and LDL elevated, which will likely improve with weight loss. Remainder of the blood work was within acceptable range. Goals:  Do not skip meals. Food log (ie.) www.Eqiancheng.com.com,Justworks,Ringz.TV,calorieking. com,etc. baritastic (use [employer], dietdoctor. com or smartphone kristel Imim[employer] for recipes)  No sugary beverages. At least 64oz of water daily. Increase physical activity by 10 minutes daily.  Gradually increase physical activity to a goal of 5 days per week for 30 minutes of MODERATE intensity PLUS 2 days per week of FULL BODY resistance training (use smartphone apps Ermias Camargo, etc.)  Start food logging, weighing and measuring food. 1200 calories per day. Get at least 64 of of water daily. Start exercise, such as walking 2 days per week for 10 minutes and gradually increase to goal of 5 days per week for 30 minutes cardiovascular exercise and 2 days resistance training. Increase Wegovy. Hypercholesteremia  - Will likely improve with weight loss. Ju Yo was seen today for consult. Diagnoses and all orders for this visit:    Obesity, Class II, BMI 35-39.9  -     Semaglutide-Weight Management [facility]) 2.4 MG/0.75ML; Inject 0.75 mL (2.4 mg total) under the skin once a week    Morbid obesity ([address])  -     Ambulatory Referral to Weight Management    Hypercholesteremia        Total time spent: 40 min, with >50% face-to-face time spent counseling patient on nonsurgical interventions for the treatment of excess weight. Discussed in detail nonsurgical options including intensive lifestyle intervention program, very low-calorie diet program and conservative program.  Discussed the role of weight loss medications. Counseled patient on diet behavior and exercise modification for weight loss. Follow up in approximately 4 months with Non-Surgical Physician/Advanced Practitioner. Subjective:   Chief Complaint   Patient presents with   • Consult     MWM Consult;Louis-160lb ; Maninder Fleming ; Stop bang-3/8       Patient ID: Yuliet Shankar  is a 63 y.o. female with excess weight/obesity here to pursue weight management. Previous notes and records have been reviewed.     Past Medical History:   Diagnosis Date   • BRCA1 negative    • BRCA2 negative    • Breast cancer ([address]) 2008    Right   • Cancer ([address])    • History of radiation therapy    • Mitral valve prolapse      Past Surgical History:   Procedure Laterality Date   • BREAST BIOPSY Right 2008    Stereo   •  SECTION     • MASTECTOMY Right 2008   • TUBAL LIGATION  10/2001       HPI:  Wt Readings from Last 20 Encounters:   07/28/23 95.6 kg (210 lb 12.8 oz)   04/28/23 100 kg (221 lb)   03/14/23 106 kg (234 lb)   03/01/23 106 kg (234 lb)   12/22/22 104 kg (228 lb 9.9 oz)   07/12/22 102 kg (225 lb 6.4 oz)   06/07/21 87.5 kg (193 lb)   09/23/20 87.5 kg (193 lb)   04/24/19 87.5 kg (193 lb)   11/30/18 92.1 kg (203 lb 1.4 oz)   07/05/16 102 kg (224 lb)   07/28/15 99 kg (218 lb 4 oz)   07/18/14 89.9 kg (198 lb 4 oz)   07/23/13 98.1 kg (216 lb 4 oz)     Obesity/Excess Weight:  Severity: Moderate  Onset:  Whole life    Modifiers: Diet and Exercise, Commercial Weight Loss Programs-ie. Weight Watchers, Dayron Pintos, Nutrisystem, etc. and Prescription Weight Loss Medications  Contributing factors: pregnancy and meopause  Associated symptoms: decreased self esteem and clothes do not fit     Taking Wegovy 1.7 mg weekly. Some constipation with Wegovy, but not too bothersome. Helping with appetite. [facility] started a few months ago by her primary care provider. Has been on current dose of 1.7 mg for 2 months. Primary care submitted for a 3rd month of 1.7 mg, but denied by pharmacy. Ju Yo has been doing well with Wegovy and would like to continue. Was on Phentermine years ago and was helpful.      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   Sleep: 7 hours  STOP bang: 3/8    Highest weight: 234 lbs  Current weight: 210.8 lbs BMI 38.56  Goal weight: 160 lbs    Colonoscopy: UTD, due again age 62  Mammogram: UTD, due March 2024    The following portions of the patient's history were reviewed and updated as appropriate: allergies, current medications, past family history, past medical history, past social history, past surgical history, and problem list.    Family History   Problem Relation Age of Onset   • Breast cancer Mother 47   • Diabetes Father • No Known Problems Daughter    • No Known Problems Maternal Grandmother    • No Known Problems Maternal Grandfather    • No Known Problems Paternal Grandmother    • No Known Problems Paternal Grandfather    • No Known Problems Daughter    • No Known Problems Daughter    • No Known Problems Daughter         Review of Systems   HENT: Negative for sore throat. Respiratory: Negative for cough and shortness of breath. Cardiovascular: Negative for chest pain and palpitations. Gastrointestinal: Positive for constipation (slight). Negative for diarrhea, nausea and vomiting. Denies GERD   Endocrine: Negative for cold intolerance and heat intolerance. Genitourinary: Negative for dysuria. Musculoskeletal: Negative for arthralgias and back pain. Skin: Negative for rash. Neurological: Negative for headaches. Psychiatric/Behavioral: Negative for suicidal ideas (or HI). Denies depression and anxiety       Objective:  /80   Pulse 79   Resp 16   Ht 5' 2" (1.575 m)   Wt 95.6 kg (210 lb 12.8 oz)   LMP 09/17/2019   BMI 38.56 kg/m²     Physical Exam  Vitals and nursing note reviewed. Constitutional   General appearance: Abnormal.  well developed and obese. Eyes No conjunctival injection. Ears, Nose, Mouth, and Throat Oral mucosa moist.   Pulmonary   Respiratory effort: No increased work of breathing or signs of respiratory distress. Cardiovascular     Examination of extremities for edema and/or varicosities: Normal.  no edema. Abdomen   Abdomen: Abnormal.  The abdomen was obese.     Musculoskeletal   Normal range of motion  Neurological   Gait and station: Normal.    Psychiatric   Orientation to person, place and time: Normal.    Affect: appropriate

## 2023-07-30 PROBLEM — E66.9 OBESITY, CLASS II, BMI 35-39.9: Status: ACTIVE | Noted: 2022-12-23

## 2023-07-30 PROBLEM — E66.812 OBESITY, CLASS II, BMI 35-39.9: Status: ACTIVE | Noted: 2022-12-23

## 2023-07-30 NOTE — ASSESSMENT & PLAN NOTE
- Discussed options of HealthyCORE-Intensive Lifestyle Intervention Program, Very Low Calorie Diet-VLCD, Conservative Program, Kait-En-Y Gastric Bypass and Vertical Sleeve Gastrectomy and the role of weight loss medications.  - Not interested in weight loss surgery. - Patient is interested in pursuing Conservative Program  - Initial weight loss goal of 5-10% weight loss for improved health  - Weight loss can improve patient's co-morbid conditions and/or prevent weight-related complications. - S/P tubal ligation  - Patient denies personal history of pancreatitis. Patient also denies personal and family history of medullary thyroid cancer and multiple endocrine neoplasia type 2 (MEN 2 tumor). Karie Woodward started March 2023 through primary care at weight of 228 lbs. Has been on current dose of 1.7 mg weekly for 2 months, tolerating well and helping with appetite. Increase to goal dose of 2.4 mg weekly. - Side effects of Wegovy discussed: nausea, vomiting, diarrhea, and constipation. If severe abdominal pain develops, stop Wegovy and go to the ER, as this could be pancreatitis. Monitor heart rate while on Wegovy and if resting heart rate greater than 100 beats per minute, patient will notify me. - Stop Alpa Umana 3/8  - Labs reviewed: A1C and lipid panel 5/2/2023. CMP and CBC 12/23/2022. TSH 9/14/2022. Chol and LDL elevated, which will likely improve with weight loss. Remainder of the blood work was within acceptable range. Goals:  Do not skip meals. Food log (ie.) www.PeopleCube.com,Spot Influence,Findersfee,calorieking. com,etc. baritastic (use [facility], dietdoctor. com or smartphone kristel [employer] for recipes)  No sugary beverages. At least 64oz of water daily. Increase physical activity by 10 minutes daily.  Gradually increase physical activity to a goal of 5 days per week for 30 minutes of MODERATE intensity PLUS 2 days per week of FULL BODY resistance training (use smartphone apps [facility], Home Workout, etc.)  Start food logging, weighing and measuring food. 1200 calories per day. Get at least 64 of of water daily. Start exercise, such as walking 2 days per week for 10 minutes and gradually increase to goal of 5 days per week for 30 minutes cardiovascular exercise and 2 days resistance training. Increase Wegovy.

## 2023-08-21 DIAGNOSIS — E66.9 OBESITY, CLASS II, BMI 35-39.9: ICD-10-CM

## 2023-08-22 RX ORDER — SEMAGLUTIDE 2.4 MG/.75ML
2.4 INJECTION, SOLUTION SUBCUTANEOUS WEEKLY
Qty: 3 ML | Refills: 3 | Status: SHIPPED | OUTPATIENT
Start: 2023-08-22 | End: 2023-09-12 | Stop reason: SDUPTHER

## 2023-09-12 DIAGNOSIS — E66.9 OBESITY, CLASS II, BMI 35-39.9: ICD-10-CM

## 2023-09-13 RX ORDER — SEMAGLUTIDE 2.4 MG/.75ML
2.4 INJECTION, SOLUTION SUBCUTANEOUS WEEKLY
Qty: 3 ML | Refills: 2 | Status: SHIPPED | OUTPATIENT
Start: 2023-09-13

## 2023-10-18 DIAGNOSIS — E66.9 OBESITY, CLASS II, BMI 35-39.9: ICD-10-CM

## 2023-10-18 DIAGNOSIS — L29.3 PERINEAL ITCHING, FEMALE: ICD-10-CM

## 2023-10-18 RX ORDER — CLOBETASOL PROPIONATE 0.5 MG/G
OINTMENT TOPICAL 2 TIMES DAILY
Qty: 30 G | Refills: 0 | Status: SHIPPED | OUTPATIENT
Start: 2023-10-18

## 2023-10-18 RX ORDER — SEMAGLUTIDE 2.4 MG/.75ML
2.4 INJECTION, SOLUTION SUBCUTANEOUS WEEKLY
Qty: 3 ML | Refills: 1 | Status: SHIPPED | OUTPATIENT
Start: 2023-10-18

## 2023-11-15 DIAGNOSIS — E66.9 OBESITY, CLASS II, BMI 35-39.9: ICD-10-CM

## 2023-11-15 RX ORDER — SEMAGLUTIDE 2.4 MG/.75ML
2.4 INJECTION, SOLUTION SUBCUTANEOUS WEEKLY
Qty: 3 ML | Refills: 1 | Status: SHIPPED | OUTPATIENT
Start: 2023-11-15

## 2023-12-08 ENCOUNTER — OFFICE VISIT (OUTPATIENT)
Dept: BARIATRICS | Facility: CLINIC | Age: 58
End: 2023-12-08
Payer: COMMERCIAL

## 2023-12-08 VITALS
HEIGHT: 62 IN | BODY MASS INDEX: 36.07 KG/M2 | WEIGHT: 196 LBS | HEART RATE: 65 BPM | DIASTOLIC BLOOD PRESSURE: 84 MMHG | SYSTOLIC BLOOD PRESSURE: 130 MMHG

## 2023-12-08 DIAGNOSIS — E66.9 OBESITY, CLASS II, BMI 35-39.9: Primary | ICD-10-CM

## 2023-12-08 PROCEDURE — 99213 OFFICE O/P EST LOW 20 MIN: CPT | Performed by: NURSE PRACTITIONER

## 2023-12-08 RX ORDER — SEMAGLUTIDE 2.4 MG/.75ML
2.4 INJECTION, SOLUTION SUBCUTANEOUS WEEKLY
Qty: 3 ML | Refills: 3 | Status: SHIPPED | OUTPATIENT
Start: 2023-12-08

## 2023-12-08 NOTE — PROGRESS NOTES
Assessment/Plan:    Obesity, Class II, BMI 35-39.9  - Patient is pursuing the Conservative Program  - Not interested in weight loss surgery. - Initial weight loss goal of 5-10% weight loss for improved health  - S/P tubal ligation  - Patient denies personal history of pancreatitis. Patient also denies personal and family history of medullary thyroid cancer and multiple endocrine neoplasia type 2 (MEN 2 tumor). Gerber Singh started March 2023 through primary care, patient reports weight was 234 lbs. - Continue Wegovy 2.4 mg weekly, some constipation, but not too bothersome. Helping with appetite. - Check CMP and TSH. Initial: 210.8 lbs BMI 38.56  Current: 196 lbs BMI 36.43  Change: -15 lbs (-38 lbs since starting [facility])  Goal: 160 lbs    Goals:  Do not skip meals. Continue food logging  Keep up the great work of getting protein with each meal  Get at least 64 of of water daily. Start exercise, such as walking 2 days per week for 10 minutes and gradually increase to goal of 5 days per week for 30 minutes cardiovascular exercise and 2 days resistance training. Continue [facility]. Raf Johnson was seen today for follow-up. Diagnoses and all orders for this visit:    Obesity, Class II, BMI 35-39.9  -     Semaglutide-Weight Management [facility]) 2.4 MG/0.75ML; Inject 0.75 mL (2.4 mg total) under the skin once a week  -     Comprehensive metabolic panel; Future  -     TSH, 3rd generation with Free T4 reflex; Future                     Follow up in approximately  4 months  with Non-Surgical Physician/Advanced Practitioner. Subjective:   Chief Complaint   Patient presents with    Follow-up     MWM  4 month F/U  waist 38.5 in       Patient ID: Topher Yuan  is a 63 y.o. female with excess weight/obesity here to pursue weight management. Patient is pursuing the Conservative Program.  Previous notes and records have been reviewed.     Past Medical History:   Diagnosis Date    BRCA1 negative     BRCA2 negative Breast cancer ([address] [facility]) 2008    Right    Cancer ([address])     History of radiation therapy     Mitral valve prolapse      Past Surgical History:   Procedure Laterality Date    BREAST BIOPSY Right 2008    Stereo     SECTION      MASTECTOMY Right 2008    TUBAL LIGATION  10/2001       HPI:  Wt Readings from Last 20 Encounters:   23 88.9 kg (196 lb)   23 95.6 kg (210 lb 12.8 oz)   23 100 kg (221 lb)   23 106 kg (234 lb)   23 106 kg (234 lb)   22 104 kg (228 lb 9.9 oz)   22 102 kg (225 lb 6.4 oz)   21 87.5 kg (193 lb)   20 87.5 kg (193 lb)   19 87.5 kg (193 lb)   18 92.1 kg (203 lb 1.4 oz)   16 102 kg (224 lb)   07/28/15 99 kg (218 lb 4 oz)   14 89.9 kg (198 lb 4 oz)   13 98.1 kg (216 lb 4 oz)     Taking Wegovy 2.4 mg weekly. Some constipation with Wegovy, but not too bothersome. Helping with appetite. Using the weight watcher's kristel. Gets 23 points. Eats similarly.      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   Sleep: 8 hours, fragmented       Colonoscopy: UTD, due again age 62  Mammogram: UTD, due 2024    The following portions of the patient's history were reviewed and updated as appropriate: allergies, current medications, past family history, past medical history, past social history, past surgical history, and problem list.    Family History   Problem Relation Age of Onset    Breast cancer Mother 47    Diabetes Father     No Known Problems Daughter     No Known Problems Maternal Grandmother     No Known Problems Maternal Grandfather     No Known Problems Paternal Grandmother     No Known Problems Paternal Grandfather No Known Problems Daughter     No Known Problems Daughter     No Known Problems Daughter         Review of Systems   HENT:  Negative for sore throat. Respiratory:  Negative for cough and shortness of breath. Cardiovascular:  Negative for chest pain and palpitations. Gastrointestinal:  Positive for constipation (slight). Negative for abdominal pain, diarrhea, nausea and vomiting. Denies GERD   Musculoskeletal:  Negative for arthralgias and back pain. Skin:  Negative for rash. Psychiatric/Behavioral:  Negative for suicidal ideas (or HI). Denies depression and anxiety       Objective:  /84   Pulse 65   Ht 5' 1.5" (1.562 m)   Wt 88.9 kg (196 lb)   LMP 09/17/2019   BMI 36.43 kg/m²     Physical Exam  Vitals and nursing note reviewed. Constitutional   General appearance: Abnormal.  well developed and obese. Eyes No conjunctival injection. Ears, Nose, Mouth, and Throat Oral mucosa moist.   Pulmonary   Respiratory effort: No increased work of breathing or signs of respiratory distress. Cardiovascular     Examination of extremities for edema and/or varicosities: Normal.  no edema. Abdomen   Abdomen: Abnormal.  The abdomen was obese.     Musculoskeletal   Normal range of motion  Neurological   Gait and station: Normal.    Psychiatric   Orientation to person, place and time: Normal.    Affect: appropriate

## 2023-12-08 NOTE — ASSESSMENT & PLAN NOTE
- Patient is pursuing the Conservative Program  - Not interested in weight loss surgery. - Initial weight loss goal of 5-10% weight loss for improved health  - S/P tubal ligation  - Patient denies personal history of pancreatitis. Patient also denies personal and family history of medullary thyroid cancer and multiple endocrine neoplasia type 2 (MEN 2 tumor). Valeria Covarrubias started March 2023 through primary care, patient reports weight was 234 lbs. - Continue Wegovy 2.4 mg weekly, some constipation, but not too bothersome. Helping with appetite. - Check CMP and TSH. Initial: 210.8 lbs BMI 38.56  Current: 196 lbs BMI 36.43  Change: -15 lbs (-38 lbs since starting [facility])  Goal: 160 lbs    Goals:  Do not skip meals. Continue food logging  Keep up the great work of getting protein with each meal  Get at least 64 of of water daily. Start exercise, such as walking 2 days per week for 10 minutes and gradually increase to goal of 5 days per week for 30 minutes cardiovascular exercise and 2 days resistance training. [facility].

## 2024-01-09 DIAGNOSIS — E66.9 OBESITY, CLASS II, BMI 35-39.9: ICD-10-CM

## 2024-01-10 RX ORDER — SEMAGLUTIDE 2.4 MG/.75ML
2.4 INJECTION, SOLUTION SUBCUTANEOUS WEEKLY
Qty: 3 ML | Refills: 3 | Status: SHIPPED | OUTPATIENT
Start: 2024-01-10

## 2024-01-16 DIAGNOSIS — N95.1 PERIMENOPAUSAL: Primary | ICD-10-CM

## 2024-02-06 DIAGNOSIS — E66.9 OBESITY, CLASS II, BMI 35-39.9: ICD-10-CM

## 2024-02-07 ENCOUNTER — TELEPHONE (OUTPATIENT)
Dept: BARIATRICS | Facility: CLINIC | Age: 59
End: 2024-02-07

## 2024-02-07 RX ORDER — SEMAGLUTIDE 2.4 MG/.75ML
2.4 INJECTION, SOLUTION SUBCUTANEOUS WEEKLY
Qty: 3 ML | Refills: 3 | Status: SHIPPED | OUTPATIENT
Start: 2024-02-07

## 2024-02-07 NOTE — TELEPHONE ENCOUNTER
Provider was informed of the patients message. Patient was informed that provider doesn't renew the wegovy savings card. Provider stated that patient should go on the website or call the company to get more info of extending her savings card.

## 2024-03-08 ENCOUNTER — TELEPHONE (OUTPATIENT)
Dept: BARIATRICS | Facility: CLINIC | Age: 59
End: 2024-03-08

## 2024-03-08 DIAGNOSIS — E66.9 OBESITY, CLASS II, BMI 35-39.9: ICD-10-CM

## 2024-03-08 RX ORDER — SEMAGLUTIDE 2.4 MG/.75ML
2.4 INJECTION, SOLUTION SUBCUTANEOUS WEEKLY
Qty: 3 ML | Refills: 3 | Status: SHIPPED | OUTPATIENT
Start: 2024-03-08

## 2024-03-08 NOTE — TELEPHONE ENCOUNTER
----- Message from Tierney Hanks sent at 3/8/2024 12:33 PM EST -----  Regarding: FW: Prior Authorization - Wegovy  Contact: [phone]    ----- Message -----  From: Anayeli Ibrahim  Sent: 3/8/2024  12:29 PM EST  To: [facility]  Subject: Prior Authorization - Wegovy                     Hello. I tried to refill my Wegovy at the [facility]. The girl at the pharmacy told me my Prior Authorization has . It's my hope that there is a way to expedite that process as she told me it could take up to 2 weeks. My next injection is Wednesday and I do not have any medication left. Your help with this matter is truly appreciated.  Thank you

## 2024-03-11 ENCOUNTER — TELEPHONE (OUTPATIENT)
Dept: BARIATRICS | Facility: CLINIC | Age: 59
End: 2024-03-11

## 2024-03-12 NOTE — TELEPHONE ENCOUNTER
PA for wegovy    Submitted via    []CMM-KEY    [x]Kwan-Case ID # [id number]   []Faxed to plan   []Other website    []Phone call Case ID #      Office notes sent, clinical questions answered. Awaiting determination    Turnaround time for your insurance to make a decision on your Prior Authorization can take 7-21 business days.

## 2024-03-15 NOTE — TELEPHONE ENCOUNTER
PA for wegovy Approved   Date(s) approved March 12, 2024 to March 12, 2025   Case #     Patient advised by [x] [place] Message                      [] Phone call       Pharmacy advised by [x]Fax                                     []Phone call    Approval letter scanned into Media Yes

## 2024-04-25 ENCOUNTER — APPOINTMENT (OUTPATIENT)
Dept: LAB | Facility: AMBULARY SURGERY CENTER | Age: 59
End: 2024-04-25
Payer: COMMERCIAL

## 2024-04-25 ENCOUNTER — LAB (OUTPATIENT)
Dept: LAB | Facility: AMBULARY SURGERY CENTER | Age: 59
End: 2024-04-25
Payer: COMMERCIAL

## 2024-04-25 DIAGNOSIS — E66.9 OBESITY, CLASS II, BMI 35-39.9: ICD-10-CM

## 2024-04-25 DIAGNOSIS — N95.1 PERIMENOPAUSAL: ICD-10-CM

## 2024-04-25 DIAGNOSIS — Z00.8 OTHER SPECIFIED GENERAL MEDICAL EXAMINATION: ICD-10-CM

## 2024-04-25 LAB
ALBUMIN SERPL BCP-MCNC: 4.3 G/DL (ref 3.5–5)
ALP SERPL-CCNC: 83 U/L (ref 34–104)
ALT SERPL W P-5'-P-CCNC: 13 U/L (ref 7–52)
ANION GAP SERPL CALCULATED.3IONS-SCNC: 7 MMOL/L (ref 4–13)
AST SERPL W P-5'-P-CCNC: 17 U/L (ref 13–39)
BILIRUB SERPL-MCNC: 0.53 MG/DL (ref 0.2–1)
BUN SERPL-MCNC: 15 MG/DL (ref 5–25)
CALCIUM SERPL-MCNC: 9.3 MG/DL (ref 8.4–10.2)
CHLORIDE SERPL-SCNC: 102 MMOL/L (ref 96–108)
CHOLEST SERPL-MCNC: 228 MG/DL
CO2 SERPL-SCNC: 31 MMOL/L (ref 21–32)
CREAT SERPL-MCNC: 0.6 MG/DL (ref 0.6–1.3)
EST. AVERAGE GLUCOSE BLD GHB EST-MCNC: 108 MG/DL
FSH SERPL-ACNC: 60.8 MIU/ML
GFR SERPL CREATININE-BSD FRML MDRD: 101 ML/MIN/1.73SQ M
GLUCOSE P FAST SERPL-MCNC: 87 MG/DL (ref 65–99)
HBA1C MFR BLD: 5.4 %
HDLC SERPL-MCNC: 62 MG/DL
LDLC SERPL CALC-MCNC: 152 MG/DL (ref 0–100)
NONHDLC SERPL-MCNC: 166 MG/DL
POTASSIUM SERPL-SCNC: 4.1 MMOL/L (ref 3.5–5.3)
PROT SERPL-MCNC: 6.9 G/DL (ref 6.4–8.4)
SODIUM SERPL-SCNC: 140 MMOL/L (ref 135–147)
TRIGL SERPL-MCNC: 72 MG/DL
TSH SERPL DL<=0.05 MIU/L-ACNC: 1.32 UIU/ML (ref 0.45–4.5)

## 2024-04-25 PROCEDURE — 83001 ASSAY OF GONADOTROPIN (FSH): CPT

## 2024-04-25 PROCEDURE — 80053 COMPREHEN METABOLIC PANEL: CPT

## 2024-04-25 PROCEDURE — 83036 HEMOGLOBIN GLYCOSYLATED A1C: CPT

## 2024-04-25 PROCEDURE — 84443 ASSAY THYROID STIM HORMONE: CPT

## 2024-04-25 PROCEDURE — 36415 COLL VENOUS BLD VENIPUNCTURE: CPT

## 2024-04-25 PROCEDURE — 80061 LIPID PANEL: CPT

## 2024-04-26 ENCOUNTER — OFFICE VISIT (OUTPATIENT)
Dept: BARIATRICS | Facility: CLINIC | Age: 59
End: 2024-04-26
Payer: COMMERCIAL

## 2024-04-26 VITALS
BODY MASS INDEX: 35.51 KG/M2 | DIASTOLIC BLOOD PRESSURE: 84 MMHG | HEART RATE: 73 BPM | TEMPERATURE: 97.5 F | SYSTOLIC BLOOD PRESSURE: 122 MMHG | RESPIRATION RATE: 17 BRPM | HEIGHT: 62 IN | WEIGHT: 193 LBS

## 2024-04-26 DIAGNOSIS — E78.00 HYPERCHOLESTEREMIA: ICD-10-CM

## 2024-04-26 DIAGNOSIS — E66.9 OBESITY, CLASS II, BMI 35-39.9: Primary | ICD-10-CM

## 2024-04-26 PROCEDURE — 99214 OFFICE O/P EST MOD 30 MIN: CPT | Performed by: NURSE PRACTITIONER

## 2024-04-26 RX ORDER — SEMAGLUTIDE 2.4 MG/.75ML
2.4 INJECTION, SOLUTION SUBCUTANEOUS WEEKLY
Qty: 3 ML | Refills: 3 | Status: SHIPPED | OUTPATIENT
Start: 2024-04-26

## 2024-04-26 NOTE — PROGRESS NOTES
Assessment/Plan:    Obesity, Class II, BMI 35-39.9  - Patient is pursuing the Conservative Program  - Not interested in weight loss surgery.    - Initial weight loss goal of 5-10% weight loss for improved health  - S/P tubal ligation  - Patient denies personal history of pancreatitis. Patient also denies personal and family history of medullary thyroid cancer and multiple endocrine neoplasia type 2 (MEN 2 tumor).  - Wegovy started March 2023 through primary care, patient reports weight was 234 lbs.   - Continue Wegovy 2.4 mg weekly, some constipation, but not too bothersome. Helping with appetite.  - Labs reviewed: A1C, lipid, TSH, and CMP 4/25/2024. Chol and LDL elevated, which will likely improve with weight loss. Remainder of the blood work within acceptable range.        Initial: 210.8 lbs BMI 38.56  Last visit: 196 lbs BMI 36.43  Current: 193 lbs BMI 35.88  Change: -18 lbs (-3 lbs since last OV) (-41 lbs since starting Wegovy)  Goal: 160 lbs    Goals:  Do not skip meals.  Continue food logging  Keep up the great work of getting protein with each meal  Keep up the great work with water intake, at least 64 of of water daily.  Start exercise, such as walking 2 days per week for 10 minutes and gradually increase to goal of 5 days per week for 30 minutes cardiovascular exercise and 2 days resistance training.   Continue Wegovy.      Hypercholesteremia  - Will likely improve with weight loss.          Anayeli was seen today for follow-up.    Diagnoses and all orders for this visit:    Obesity, Class II, BMI 35-39.9  -     Semaglutide-Weight Management (Wegovy) 2.4 MG/0.75ML; Inject 0.75 mL (2.4 mg total) under the skin once a week    Hypercholesteremia              Follow up in approximately  3-4 months  with Non-Surgical Physician/Advanced Practitioner.    Subjective:   Chief Complaint   Patient presents with    Follow-up     MWM-4M F/u       Patient ID: Anayeli Ibrahim  is a 58 y.o. female with excess weight/obesity  here to pursue weight management. Patient is pursuing the Conservative Program.  Previous notes and records have been reviewed.    Past Medical History:   Diagnosis Date    BRCA1 negative     BRCA2 negative     Breast cancer ([facility]) 2008    Right    Cancer ([facility])     History of radiation therapy 2008    Mitral valve prolapse      Past Surgical History:   Procedure Laterality Date    BREAST BIOPSY Right 2008    Stereo     SECTION      MASTECTOMY Right 2008    TUBAL LIGATION  10/2001       HPI:  Wt Readings from Last 20 Encounters:   24 87.5 kg (193 lb)   23 88.9 kg (196 lb)   23 95.6 kg (210 lb 12.8 oz)   23 100 kg (221 lb)   23 106 kg (234 lb)   23 106 kg (234 lb)   22 104 kg (228 lb 9.9 oz)   22 102 kg (225 lb 6.4 oz)   21 87.5 kg (193 lb)   20 87.5 kg (193 lb)   19 87.5 kg (193 lb)   18 92.1 kg (203 lb 1.4 oz)   16 102 kg (224 lb)   07/28/15 99 kg (218 lb 4 oz)   14 89.9 kg (198 lb 4 oz)   13 98.1 kg (216 lb 4 oz)     Taking Wegovy 2.4 mg weekly. Some constipation with Wegovy, but not too bothersome. Does not feel that dose needs to be reduced due to the constipation. Helping with appetite.     Using the weight watcher's kristel. Eats similarly.     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   Sleep: 6-8 hours      Colonoscopy: UTD, due again age 61  Mammogram: past due, was due 2024, order offered, but she wants to get from her gyn    The following portions of the patient's history were reviewed and updated as appropriate: allergies, current medications, past family history, past  "medical history, past social history, past surgical history, and problem list.    Family History   Problem Relation Age of Onset    Breast cancer Mother 54    Diabetes Father     No Known Problems Daughter     No Known Problems Maternal Grandmother     No Known Problems Maternal Grandfather     No Known Problems Paternal Grandmother     No Known Problems Paternal Grandfather     No Known Problems Daughter     No Known Problems Daughter     No Known Problems Daughter         Review of Systems   HENT:  Negative for sore throat.    Respiratory:  Negative for cough and shortness of breath.    Cardiovascular:  Negative for chest pain and palpitations.   Gastrointestinal:  Positive for constipation (slight). Negative for abdominal pain, diarrhea, nausea and vomiting.        Denies GERD   Musculoskeletal:  Negative for arthralgias and back pain.   Skin:  Negative for rash.   Psychiatric/Behavioral:  Negative for suicidal ideas (or HI).         Denies depression and anxiety       Objective:  /84   Pulse 73   Temp 97.5 °F (36.4 °C)   Resp 17   Ht 5' 1.5\" (1.562 m)   Wt 87.5 kg (193 lb)   LMP 09/17/2019   BMI 35.88 kg/m²     Physical Exam  Vitals and nursing note reviewed.        Constitutional   General appearance: Abnormal.  well developed and obese.   Eyes No conjunctival injection.   Ears, Nose, Mouth, and Throat Oral mucosa moist.   Pulmonary   Respiratory effort: No increased work of breathing or signs of respiratory distress.     Cardiovascular     Examination of extremities for edema and/or varicosities: Normal.  no edema.   Abdomen   Abdomen: Abnormal.  The abdomen was obese.    Musculoskeletal   Normal range of motion  Neurological   Gait and station: Normal.    Psychiatric   Orientation to person, place and time: Normal.    Affect: appropriate        "

## 2024-04-28 NOTE — ASSESSMENT & PLAN NOTE
- Patient is pursuing the Conservative Program  - Not interested in weight loss surgery.    - Initial weight loss goal of 5-10% weight loss for improved health  - S/P tubal ligation  - Patient denies personal history of pancreatitis. Patient also denies personal and family history of medullary thyroid cancer and multiple endocrine neoplasia type 2 (MEN 2 tumor).  - Wegovy started March 2023 through primary care, patient reports weight was 234 lbs.   - Continue Wegovy 2.4 mg weekly, some constipation, but not too bothersome. Helping with appetite.  - Labs reviewed: A1C, lipid, TSH, and CMP 4/25/2024. Chol and LDL elevated, which will likely improve with weight loss. Remainder of the blood work within acceptable range.        Initial: 210.8 lbs BMI 38.56  Last visit: 196 lbs BMI 36.43  Current: 193 lbs BMI 35.88  Change: -18 lbs (-3 lbs since last OV) (-41 lbs since starting Wegovy)  Goal: 160 lbs    Goals:  Do not skip meals.  Continue food logging  Keep up the great work of getting protein with each meal  Keep up the great work with water intake, at least 64 of of water daily.  Start exercise, such as walking 2 days per week for 10 minutes and gradually increase to goal of 5 days per week for 30 minutes cardiovascular exercise and 2 days resistance training.   Continue Wegovy.

## 2024-06-13 ENCOUNTER — HOSPITAL ENCOUNTER (OUTPATIENT)
Dept: MAMMOGRAPHY | Facility: HOSPITAL | Age: 59
Discharge: HOME | End: 2024-06-13
Attending: OBSTETRICS & GYNECOLOGY
Payer: COMMERCIAL

## 2024-06-13 DIAGNOSIS — Z12.31 SCREENING MAMMOGRAM FOR BREAST CANCER: ICD-10-CM

## 2024-06-13 DIAGNOSIS — Z12.31 ENCOUNTER FOR SCREENING MAMMOGRAM FOR MALIGNANT NEOPLASM OF BREAST: ICD-10-CM

## 2024-06-13 DIAGNOSIS — Z12.31 SCREENING MAMMOGRAM FOR BREAST CANCER: Primary | ICD-10-CM

## 2024-06-13 PROCEDURE — 77063 BREAST TOMOSYNTHESIS BI: CPT

## 2024-06-13 PROCEDURE — 77067 SCR MAMMO BI INCL CAD: CPT

## 2024-07-29 ENCOUNTER — OFFICE VISIT (OUTPATIENT)
Dept: PLASTIC SURGERY | Facility: CLINIC | Age: 59
End: 2024-07-29
Payer: COMMERCIAL

## 2024-07-29 VITALS
HEIGHT: 62 IN | SYSTOLIC BLOOD PRESSURE: 122 MMHG | WEIGHT: 187 LBS | BODY MASS INDEX: 34.41 KG/M2 | DIASTOLIC BLOOD PRESSURE: 80 MMHG

## 2024-07-29 DIAGNOSIS — Z42.1 AFTERCARE POSTMASTECTOMY FOR BREAST RECONSTRUCTION: Primary | ICD-10-CM

## 2024-07-29 PROCEDURE — 99204 OFFICE O/P NEW MOD 45 MIN: CPT | Performed by: SURGERY

## 2024-07-29 NOTE — PROGRESS NOTES
Assessment/Plan: Please see HPI.  We discussed reduction mammoplasty/mastopexy of the left breast.  She better symmetry with the right breast, however we also discussed the importance of her continuing to pursue her weight loss goals, and to maintain weight stability for several months prior to considering this procedure.  In the meantime, she would be interested in autologous fat grafting to treat the deficient areas of the reconstructed right breast, specifically the lower medial third of the breast.  The abdomen will be used for fat harvest.  I discussed the procedure in detail, how it is performed, as well as potential risks, complications and limitations including, but not limited to infection, bleeding, scarring, asymmetry, contour, lumps/bumps/cyst formation, potential need for multiple procedures, etc. etc.  She understands, her questions were answered to her satisfaction and consent obtained.  She will work with our coordinator to arrange a date for the procedure.  Additionally, she was interested in addressing bilateral accessory axillary breast tissue, and will work with our staff regarding documentation for preauthorization.     There are no diagnoses linked to this encounter.      Subjective: Breast reconstruction     Patient ID: Anayeli Ibrahim is a 59 y.o. female.    HPI Anayeli presents after a many year hiatus, briefly, she is status post DCIS and right mastectomy with subsequent TRAM flap reconstruction.  She presents today to discuss options regarding breast asymmetry, the reconstructed right breast TRAM flap is smaller than the natural left breast implant.  Of note, she is working with weight management regarding her weight loss goals with a stated goal of 160 pounds, recent weight is listed as 187 pounds.  She understands the importance of achieving and maintaining her goal weight prior to undergoing left breast balancing reduction mammoplasty.    Review of Systems   Constitutional:  Negative for  chills and fever.   HENT:  Negative for hearing loss.    Eyes:  Positive for visual disturbance. Negative for discharge.   Respiratory:  Negative for chest tightness and shortness of breath.    Cardiovascular:  Negative for chest pain and leg swelling.   Gastrointestinal:  Positive for constipation. Negative for blood in stool, diarrhea and nausea.   Genitourinary:  Negative for dysuria.   Musculoskeletal:  Negative for gait problem.   Skin:  Negative for rash.   Allergic/Immunologic: Negative for immunocompromised state.   Neurological:  Negative for seizures and headaches.   Hematological:  Does not bruise/bleed easily.   Psychiatric/Behavioral:  Negative for dysphoric mood. The patient is not nervous/anxious.        Objective:     Physical Exam  HENT:      Head: Normocephalic and atraumatic.   Eyes:      Extraocular Movements: Extraocular movements intact.      Pupils: Pupils are equal, round, and reactive to light.   Cardiovascular:      Rate and Rhythm: Normal rate.   Pulmonary:      Effort: Pulmonary effort is normal.   Abdominal:      Palpations: Abdomen is soft.      Comments: Well-healed TRAM flap donor site scar   Musculoskeletal:         General: Normal range of motion.      Cervical back: Normal range of motion and neck supple.   Skin:     General: Skin is warm.      Comments: Breast examination reveals a reconstructed right breast, TRAM flap, fully viable, the contralateral left breast is larger and more dependent than the reconstructed right breast, there is some deficiency of the lower medial third of the reconstructed right breast, there is prominence of bilateral axillary/chest consistent with bilateral accessory axillary breast tissue, see photos in [employer]   Neurological:      Mental Status: She is alert and oriented to person, place, and time.   Psychiatric:         Mood and Affect: Mood normal.

## 2024-07-30 ENCOUNTER — OFFICE VISIT (OUTPATIENT)
Dept: FAMILY MEDICINE CLINIC | Facility: CLINIC | Age: 59
End: 2024-07-30
Payer: COMMERCIAL

## 2024-07-30 VITALS
HEART RATE: 64 BPM | SYSTOLIC BLOOD PRESSURE: 114 MMHG | BODY MASS INDEX: 35.11 KG/M2 | RESPIRATION RATE: 18 BRPM | OXYGEN SATURATION: 97 % | HEIGHT: 62 IN | DIASTOLIC BLOOD PRESSURE: 70 MMHG | WEIGHT: 190.8 LBS

## 2024-07-30 DIAGNOSIS — Z80.8 FAMILY HISTORY OF MALIGNANT MELANOMA: ICD-10-CM

## 2024-07-30 DIAGNOSIS — Z00.00 PHYSICAL EXAM, ANNUAL: Primary | ICD-10-CM

## 2024-07-30 DIAGNOSIS — D22.9 MULTIPLE NEVI: ICD-10-CM

## 2024-07-30 PROCEDURE — 99396 PREV VISIT EST AGE 40-64: CPT | Performed by: FAMILY MEDICINE

## 2024-07-30 NOTE — PROGRESS NOTES
"Ambulatory Visit  Name: Anayeli Ibrahim      : 1965      MRN: [id number]  Encounter Provider: Dee Dee Suggs DO  Encounter Date: 2024   Encounter department: [facility]    Assessment & Plan   1. Physical exam, annual  Comments:  doing well  continue working on weight loss and add in exercise  otherwise up to date preventively  refer to derm  2. Multiple nevi  -     Ambulatory Referral to Dermatology; Future  3. Family history of malignant melanoma  -     Ambulatory Referral to Dermatology; Future       History of Present Illness     HPI  Pt presents for physical exam.  Seeing weight mgmt.  Up to date on imm's.  Up to date on labs.  Up to date on mammogram and crc screening.  Had screening with colorectal at [facility].  Haven't received records.  Not really exercising.  Seeing dental.      Has multiple freckles and daughters have had melanoma.  Pt does not routinely see derm    Review of Systems   Constitutional:  Negative for chills, fatigue, fever and unexpected weight change.   HENT:  Negative for congestion, ear pain, hearing loss, postnasal drip, rhinorrhea, sinus pressure, sinus pain, sore throat, trouble swallowing and voice change.    Eyes:  Negative for pain, redness and visual disturbance.   Respiratory:  Negative for cough and shortness of breath.    Cardiovascular:  Negative for chest pain, palpitations and leg swelling.   Gastrointestinal:  Negative for abdominal pain, constipation, diarrhea and nausea.   Endocrine: Negative for cold intolerance, heat intolerance, polydipsia and polyuria.   Genitourinary:  Negative for dysuria, frequency and urgency.   Musculoskeletal:  Negative for arthralgias, joint swelling and myalgias.   Skin:  Negative for rash.        No suspicious lesions   Neurological:  Negative for weakness, numbness and headaches.   Hematological:  Negative for adenopathy.       Objective     /70   Pulse 64   Resp 18   Ht 5' 1.69\" (1.567 m)   Wt 86.5 kg " (190 lb 12.8 oz)   LMP 09/17/2019   SpO2 97%   BMI 35.25 kg/m²     Physical Exam  Constitutional:       Appearance: Normal appearance.   HENT:      Head: Normocephalic and atraumatic.      Right Ear: Tympanic membrane, ear canal and external ear normal.      Left Ear: Tympanic membrane, ear canal and external ear normal.      Nose: Nose normal. No congestion.      Mouth/Throat:      Mouth: Mucous membranes are moist.      Pharynx: No oropharyngeal exudate or posterior oropharyngeal erythema.   Eyes:      Extraocular Movements: Extraocular movements intact.      Conjunctiva/sclera: Conjunctivae normal.      Pupils: Pupils are equal, round, and reactive to light.   Neck:      Vascular: No carotid bruit.   Cardiovascular:      Rate and Rhythm: Normal rate and regular rhythm.      Heart sounds: No murmur heard.     No friction rub. No gallop.   Pulmonary:      Effort: Pulmonary effort is normal.      Breath sounds: No wheezing, rhonchi or rales.   Abdominal:      General: Abdomen is flat. There is no distension.      Palpations: Abdomen is soft.      Tenderness: There is no abdominal tenderness.   Musculoskeletal:      Cervical back: Neck supple.   Lymphadenopathy:      Cervical: No cervical adenopathy.   Neurological:      General: No focal deficit present.      Mental Status: She is alert and oriented to person, place, and time.      Cranial Nerves: No cranial nerve deficit.      Motor: No weakness.      Deep Tendon Reflexes: Reflexes normal.       Administrative Statements

## 2024-07-31 ENCOUNTER — TELEPHONE (OUTPATIENT)
Dept: FAMILY MEDICINE CLINIC | Facility: CLINIC | Age: 59
End: 2024-07-31

## 2024-07-31 NOTE — TELEPHONE ENCOUNTER
Poly for [facility] endoscopy requesting fax number for office to send records request to.

## 2024-08-06 ENCOUNTER — PREP FOR PROCEDURE (OUTPATIENT)
Dept: PLASTIC SURGERY | Facility: CLINIC | Age: 59
End: 2024-08-06

## 2024-08-06 DIAGNOSIS — Z85.3 PERSONAL HISTORY OF BREAST CANCER: Primary | ICD-10-CM

## 2024-09-06 ENCOUNTER — CLINICAL SUPPORT (OUTPATIENT)
Dept: BARIATRICS | Facility: CLINIC | Age: 59
End: 2024-09-06

## 2024-09-06 VITALS
HEART RATE: 78 BPM | SYSTOLIC BLOOD PRESSURE: 120 MMHG | DIASTOLIC BLOOD PRESSURE: 74 MMHG | TEMPERATURE: 98.5 F | WEIGHT: 188.6 LBS | HEIGHT: 62 IN | BODY MASS INDEX: 34.71 KG/M2 | RESPIRATION RATE: 17 BRPM

## 2024-09-06 DIAGNOSIS — R63.5 ABNORMAL WEIGHT GAIN: Primary | ICD-10-CM

## 2024-09-06 PROCEDURE — RECHECK

## 2024-09-06 NOTE — PROGRESS NOTES
Patient last visit weight: 193lbs   Patient current visit weight: 188.6lbs     If you are taking phentermine or other oral weight loss medications, are you experiencing any of the following symptoms:  Headache:   Blurred Vision:   Chest Pain:   Palpitations:  Insomnia:   SPECIFY ORAL MEDICATION AND DOSAGE:     If you are taking an injectable medication,  are you experiencing any of the following symptoms:  Bloating: NO   Nausea: NO   Vomiting: NO  Constipation: YES- but has been since on medication and states its tolerable   Diarrhea: NO   SPECIFY INJECTABLE MEDICATION AND CURRENT DOSAGE: Wegovy 2.4mg. Patient requesting refills to be sent to [facility] in [place].     Patient is asking if she should schedule another visit since f/u is in Feb.       Vitals:    Is BP less than 100/60? NO   Is BP greater than 140/90? NO   Is HR greater than 100? NO   **If yes to any of the above, have patient relax and repeat in 5-10 minutes**    Repeat values:    Is BP less than 100/60?  Is BP greater than 140/90?  Is HR greater than 100?  **If values remain outside of ranges above, please consult provider for next steps**

## 2024-09-11 DIAGNOSIS — E66.9 OBESITY, CLASS II, BMI 35-39.9: ICD-10-CM

## 2024-09-11 RX ORDER — SEMAGLUTIDE 2.4 MG/.75ML
2.4 INJECTION, SOLUTION SUBCUTANEOUS WEEKLY
Qty: 3 ML | Refills: 1 | Status: SHIPPED | OUTPATIENT
Start: 2024-09-11

## 2024-10-24 ENCOUNTER — LAB (OUTPATIENT)
Dept: LAB | Facility: AMBULARY SURGERY CENTER | Age: 59
End: 2024-10-24
Payer: COMMERCIAL

## 2024-10-24 DIAGNOSIS — Z11.3 SCREENING EXAMINATION FOR STI: ICD-10-CM

## 2024-10-24 DIAGNOSIS — Z11.3 SCREENING EXAMINATION FOR STI: Primary | ICD-10-CM

## 2024-10-24 LAB
HBV SURFACE AG SER QL: NORMAL
HCV AB SER QL: NORMAL
HIV 1+2 AB+HIV1 P24 AG SERPL QL IA: NORMAL
HIV 2 AB SERPL QL IA: NORMAL
HIV1 AB SERPL QL IA: NORMAL
HIV1 P24 AG SERPL QL IA: NORMAL
TREPONEMA PALLIDUM IGG+IGM AB [PRESENCE] IN SERUM OR PLASMA BY IMMUNOASSAY: NORMAL

## 2024-10-24 PROCEDURE — 87591 N.GONORRHOEAE DNA AMP PROB: CPT

## 2024-10-24 PROCEDURE — 87491 CHLMYD TRACH DNA AMP PROBE: CPT

## 2024-10-24 PROCEDURE — 36415 COLL VENOUS BLD VENIPUNCTURE: CPT

## 2024-10-24 PROCEDURE — 87389 HIV-1 AG W/HIV-1&-2 AB AG IA: CPT

## 2024-10-24 PROCEDURE — 86780 TREPONEMA PALLIDUM: CPT

## 2024-10-24 PROCEDURE — 86803 HEPATITIS C AB TEST: CPT

## 2024-10-24 PROCEDURE — 87340 HEPATITIS B SURFACE AG IA: CPT

## 2024-10-25 LAB
C TRACH DNA SPEC QL NAA+PROBE: NEGATIVE
N GONORRHOEA DNA SPEC QL NAA+PROBE: NEGATIVE

## 2024-11-08 DIAGNOSIS — L29.3 PERINEAL ITCHING, FEMALE: ICD-10-CM

## 2024-11-08 RX ORDER — CLOBETASOL PROPIONATE 0.5 MG/G
OINTMENT TOPICAL 2 TIMES DAILY
Qty: 30 G | Refills: 0 | Status: CANCELLED | OUTPATIENT
Start: 2024-11-08

## 2024-11-08 RX ORDER — CLOBETASOL PROPIONATE 0.5 MG/G
OINTMENT TOPICAL 2 TIMES DAILY
Qty: 30 G | Refills: 0 | Status: SHIPPED | OUTPATIENT
Start: 2024-11-08

## 2024-11-13 DIAGNOSIS — E66.812 OBESITY, CLASS II, BMI 35-39.9: ICD-10-CM

## 2024-11-14 RX ORDER — SEMAGLUTIDE 2.4 MG/.75ML
INJECTION, SOLUTION SUBCUTANEOUS
Qty: 3 ML | Refills: 0 | Status: SHIPPED | OUTPATIENT
Start: 2024-11-14

## 2024-11-14 NOTE — TELEPHONE ENCOUNTER
30 day RX approved  Last seen in April, needs sooner appt which should be available in [place] office.

## 2024-12-13 ENCOUNTER — CLINICAL SUPPORT (OUTPATIENT)
Age: 59
End: 2024-12-13

## 2024-12-13 VITALS
RESPIRATION RATE: 16 BRPM | HEIGHT: 62 IN | SYSTOLIC BLOOD PRESSURE: 124 MMHG | BODY MASS INDEX: 34.74 KG/M2 | DIASTOLIC BLOOD PRESSURE: 70 MMHG | WEIGHT: 188.8 LBS | TEMPERATURE: 98.1 F | HEART RATE: 67 BPM

## 2024-12-13 DIAGNOSIS — R63.5 ABNORMAL WEIGHT GAIN: Primary | ICD-10-CM

## 2024-12-13 PROCEDURE — RECHECK

## 2024-12-13 NOTE — PROGRESS NOTES
Patient last visit weight: 188 lb 9.6 oz  Patient current visit weight: 188.8 lb    If you are taking phentermine or other oral weight loss medications, are you experiencing any of the following symptoms:  Headache:   Blurred Vision:   Chest Pain:   Palpitations:  Insomnia:   SPECIFY ORAL MEDICATION AND DOSAGE:     If you are taking an injectable medication,  are you experiencing any of the following symptoms:  Bloating: NO  Nausea:NO  Vomiting: NO  Constipation: Yes  Diarrhea:NO  SPECIFY INJECTABLE MEDICATION AND CURRENT DOSAGE:Wegovy 2.4 mg- [facility]  Vitals:    Is BP less than 100/60?NO  Is BP greater than 140/90?NO  Is HR greater than 100?NO  **If yes to any of the above, have patient relax and repeat in 5-10 minutes**    Repeat values:    Is BP less than 100/60?  Is BP greater than 140/90?  Is HR greater than 100?  **If values remain outside of ranges above, please consult provider for next steps**      Date of last injection:    How many injections do you have left:

## 2024-12-16 DIAGNOSIS — E66.812 OBESITY, CLASS II, BMI 35-39.9: ICD-10-CM

## 2024-12-16 RX ORDER — SEMAGLUTIDE 2.4 MG/.75ML
2.4 INJECTION, SOLUTION SUBCUTANEOUS WEEKLY
Qty: 3 ML | Refills: 2 | Status: SHIPPED | OUTPATIENT
Start: 2024-12-16

## 2025-01-29 ENCOUNTER — OFFICE VISIT (OUTPATIENT)
Dept: FAMILY MEDICINE CLINIC | Facility: CLINIC | Age: 60
End: 2025-01-29
Payer: COMMERCIAL

## 2025-01-29 VITALS
HEIGHT: 62 IN | WEIGHT: 192.2 LBS | BODY MASS INDEX: 35.37 KG/M2 | DIASTOLIC BLOOD PRESSURE: 90 MMHG | SYSTOLIC BLOOD PRESSURE: 132 MMHG | HEART RATE: 76 BPM | TEMPERATURE: 97.9 F

## 2025-01-29 DIAGNOSIS — F43.23 ADJUSTMENT DISORDER WITH MIXED ANXIETY AND DEPRESSED MOOD: Primary | ICD-10-CM

## 2025-01-29 PROCEDURE — 99213 OFFICE O/P EST LOW 20 MIN: CPT | Performed by: FAMILY MEDICINE

## 2025-01-29 RX ORDER — SERTRALINE HYDROCHLORIDE 25 MG/1
TABLET, FILM COATED ORAL
Qty: 60 TABLET | Refills: 1 | Status: SHIPPED | OUTPATIENT
Start: 2025-01-29

## 2025-01-29 NOTE — PATIENT INSTRUCTIONS
Start zoloft 25mg tabs: 1/2 tab daily x 1 week, then 1 tab daily x 1 week, then 1 1/2 tabs daily x 1 week, then 2 tabs daily  F/u 6 weeks

## 2025-01-29 NOTE — PROGRESS NOTES
"Name: Anayeli Ibrahim      : 1965      MRN: [id number]  Encounter Provider: Dee Dee Suggs DO  Encounter Date: 2025   Encounter department: [facility]  :  Assessment & Plan  Adjustment disorder with mixed anxiety and depressed mood    Discussed options  Start zoloft and uptitrate to 50mg daily  F/u 6 weeks  Recommend counseling    Orders:    sertraline (ZOLOFT) 25 mg tablet; 1/2 tab daily x 1 week, then 1 tab daily x 1 week, then 1 1/2 tabs daily x 1 week, then 2 tabs daily           History of Present Illness   HPI    Pt c/o multiple stressors over the last year (dad passing,  cheating, work stressors) and notes that she has been experiencing low mood/energy/motivation and sadness for months.  Overthinks and worries about everything.  No si/hi.      Review of Systems  See hpi     Objective   /90 (Patient Position: Sitting, Cuff Size: Large)   Pulse 76   Temp 97.9 °F (36.6 °C) (Temporal)   Ht 5' 1.5\" (1.562 m)   Wt 87.2 kg (192 lb 3.2 oz)   LMP 2019   BMI 35.73 kg/m²      Physical Exam  Constitutional:       Appearance: Normal appearance.   HENT:      Head: Normocephalic and atraumatic.   Cardiovascular:      Rate and Rhythm: Normal rate and regular rhythm.      Heart sounds: No murmur heard.     No friction rub. No gallop.   Pulmonary:      Effort: Pulmonary effort is normal.      Breath sounds: No wheezing, rhonchi or rales.   Neurological:      General: No focal deficit present.      Mental Status: She is alert and oriented to person, place, and time.   Psychiatric:         Attention and Perception: Attention normal.         Mood and Affect: Mood is depressed. Affect is tearful.         Speech: Speech normal.         Behavior: Behavior normal.         Thought Content: Thought content normal.         Cognition and Memory: Cognition normal.         Judgment: Judgment normal.         "

## 2025-02-21 ENCOUNTER — OFFICE VISIT (OUTPATIENT)
Age: 60
End: 2025-02-21
Payer: COMMERCIAL

## 2025-02-21 VITALS
HEIGHT: 62 IN | WEIGHT: 187.6 LBS | DIASTOLIC BLOOD PRESSURE: 80 MMHG | BODY MASS INDEX: 34.52 KG/M2 | HEART RATE: 74 BPM | SYSTOLIC BLOOD PRESSURE: 119 MMHG

## 2025-02-21 DIAGNOSIS — E66.811 OBESITY, CLASS I, BMI 30-34.9: Primary | ICD-10-CM

## 2025-02-21 DIAGNOSIS — E78.00 HYPERCHOLESTEREMIA: ICD-10-CM

## 2025-02-21 PROCEDURE — 99213 OFFICE O/P EST LOW 20 MIN: CPT | Performed by: PHYSICIAN ASSISTANT

## 2025-02-21 RX ORDER — TIRZEPATIDE 2.5 MG/.5ML
2.5 INJECTION, SOLUTION SUBCUTANEOUS WEEKLY
Qty: 2 ML | Refills: 0 | Status: SHIPPED | OUTPATIENT
Start: 2025-02-21 | End: 2025-03-21

## 2025-02-21 NOTE — PROGRESS NOTES
Assessment/Plan:    1. Obesity, Class I, BMI 30-34.9  tirzepatide (Zepbound) 2.5 mg/0.5 mL auto-injector          Wegovy Start: 234 lbs  Last visit: 193 lbs BMI 35.88 (4/2024)  Current: 187 lbs BMI 34.31 (2/21/25)  Change:  -47 lbs (-20% TBW)  Goal: 160 lbs    - Weight not at goal  - Patient is interested in Conservative Program  - Labs reviewed: As below.    General Recommendations:  Nutrition:  Eat breakfast daily.  Do not skip meals.     Food log (ie.) www.Puralytics.com, sparkpeople.com, loseit.com, calorieking.com, etc.    Practice mindful eating.  Be sure to set aside time to eat, eat slowly, and savor your food.    Hydration:    At least 64oz of water daily.  No sugar sweetened beverages.  No juice (eat the fruit instead).    Exercise:  Studies have shown that the ideal exercise goal is somewhere between 150 to 300 minutes of moderate intensity exercise a week.  Start with exercising 10 minutes every other day and gradually increase physical activity with a goal of at least 150 minutes of moderate intensity exercise a week, divided over at least 3 days a week.  An example of this would be exercising 30 minutes a day, 5 days a week.  Resistance training can increase muscle mass and increase our resting metabolic rate.   FULL BODY resistance training is recommended 2-3 times a week.  Do not do this on consecutive days to allow for muscle recovery.    Aim for a bare minimum 5000 steps, even on days you do not exercise.    Monitoring:   Weigh yourself daily.  If this causes undue stress, then just weigh yourself once a week.  Weigh yourself the same time of the day with the same amount of clothing on.  Preferably this should be done after waking up, before you eat, and with no clothing or minimal clothing on.    Calorie goal:  1400 alfredo/day    Return visit:    Calorie tracking/deficit  Physical activity goals  AOM  Contrapcetion: menopause  Weight has plateau on wegovy 2.4mg  Will switch to zepbound. Use and  "side effect profile reviewed  Patient denies personal history of pancreatitis. Patient also denies personal and family history of thyroid cancer and multiple endocrine neoplasia type 2 (MEN 2 tumor).   RTC: 4 months     Subjective:   Chief Complaint   Patient presents with    Follow-up     Pt is here for MWM f/u. Waist - 36.5\"       Patient ID: Anayeli Ibrahim  is a 59 y.o. female with excess weight/obesity here to pursue weight management. Patient is pursuing the Conservative Program.  Previous notes and records have been reviewed.    Past Medical History:   Diagnosis Date    BRCA1 negative     BRCA2 negative     Breast cancer (HCC) 2008    Right    Cancer (HCC)     History of radiation therapy     Mitral valve prolapse      Past Surgical History:   Procedure Laterality Date    BREAST BIOPSY Right 2008    Stereo     SECTION      MASTECTOMY Right 2008    TUBAL LIGATION  10/2001       HPI:  Wt Readings from Last 20 Encounters:   25 85.1 kg (187 lb 9.6 oz)   25 87.2 kg (192 lb 3.2 oz)   24 85.6 kg (188 lb 12.8 oz)   24 85.5 kg (188 lb 9.6 oz)   24 86.5 kg (190 lb 12.8 oz)   24 84.8 kg (187 lb)   24 87.5 kg (193 lb)   23 88.9 kg (196 lb)   23 95.6 kg (210 lb 12.8 oz)   23 100 kg (221 lb)   23 106 kg (234 lb)   23 106 kg (234 lb)   22 104 kg (228 lb 9.9 oz)   22 102 kg (225 lb 6.4 oz)   21 87.5 kg (193 lb)   20 87.5 kg (193 lb)   19 87.5 kg (193 lb)   18 92.1 kg (203 lb 1.4 oz)   16 102 kg (224 lb)   07/28/15 99 kg (218 lb 4 oz)     Taking Wegovy 2.4 mg weekly. Some constipation with Wegovy, but not too bothersome.  BM - taking miralax     Using the weight watcher's kristel. Eats similarly.     B: chobani yogurt  L: salad + greek yogurt + granol. Or taco bar  D: protein and veggie. Cheese steak or burger minus the bread    Exercise - currently none     Hydration: 64 oz of water, " "celsius one daily. Diet snapple   Alcohol: 1 drink per week if that  Smoking: denies  Exercise: none  Occupation:  gyn [employer]   Sleep: 6-8 hours      Colonoscopy: UTD, due again age 61  Mammogram: past due, was due March 2024, order offered, but she wants to get from her gyn    The following portions of the patient's history were reviewed and updated as appropriate: allergies, current medications, past family history, past medical history, past social history, past surgical history, and problem list.    Family History   Problem Relation Age of Onset    Breast cancer Mother 54    Diabetes Father     No Known Problems Daughter     No Known Problems Daughter     No Known Problems Daughter     No Known Problems Daughter     No Known Problems Maternal Grandmother     No Known Problems Maternal Grandfather     No Known Problems Paternal Grandmother     No Known Problems Paternal Grandfather         Review of Systems   HENT:  Negative for sore throat.    Respiratory:  Negative for cough and shortness of breath.    Cardiovascular:  Negative for chest pain and palpitations.   Gastrointestinal:  Positive for constipation (slight). Negative for abdominal pain, diarrhea, nausea and vomiting.        Denies GERD   Musculoskeletal:  Negative for arthralgias and back pain.   Skin:  Negative for rash.   Psychiatric/Behavioral:  Negative for suicidal ideas (or HI).         Denies depression and anxiety       Objective:  /80 (Patient Position: Sitting, Cuff Size: Large)   Pulse 74   Ht 5' 2\" (1.575 m)   Wt 85.1 kg (187 lb 9.6 oz)   LMP 09/17/2019   BMI 34.31 kg/m²     Physical Exam  Vitals and nursing note reviewed.        Constitutional   General appearance: Abnormal.  well developed and obese.   Eyes No conjunctival injection.   Ears, Nose, Mouth, and Throat Oral mucosa moist.   Pulmonary   Respiratory effort: No increased work of breathing or signs of respiratory distress.     Cardiovascular   "   Examination of extremities for edema and/or varicosities: Normal.  no edema.   Abdomen   Abdomen: Abnormal.  The abdomen was obese.    Musculoskeletal   Normal range of motion  Neurological   Gait and station: Normal.    Psychiatric   Orientation to person, place and time: Normal.    Affect: appropriate

## 2025-02-26 ENCOUNTER — TELEPHONE (OUTPATIENT)
Age: 60
End: 2025-02-26

## 2025-02-26 NOTE — TELEPHONE ENCOUNTER
PA for Zepbound 2.5 mg SUBMITTED to [facility]     via    [x]CMM-KEY: [id number]   []Surescripts-Case ID #   []Availity-Auth ID # NDC #   []Faxed to plan   []Other website   []Phone call Case ID #     [x]PA sent as URGENT    All office notes, labs and other pertaining documents and studies sent. Clinical questions answered. Awaiting determination from insurance company.     Turnaround time for your insurance to make a decision on your Prior Authorization can take 7-21 business days.

## 2025-02-26 NOTE — TELEPHONE ENCOUNTER
PA for zepbound 2.5 mg  APPROVED     Date(s) approved 2/26/2025    Case #    Patient advised by          [x]MyChart Message  []Phone call   []LMOM  []L/M to call office as no active Communication consent on file  []Unable to leave detailed message as VM not approved on Communication consent       Pharmacy advised by    []Fax  [x]Phone call    Specialty Pharmacy    []     Approval letter scanned into Media Yes

## 2025-03-10 ENCOUNTER — PATIENT MESSAGE (OUTPATIENT)
Dept: FAMILY MEDICINE CLINIC | Facility: CLINIC | Age: 60
End: 2025-03-10

## 2025-03-10 DIAGNOSIS — F43.23 ADJUSTMENT DISORDER WITH MIXED ANXIETY AND DEPRESSED MOOD: ICD-10-CM

## 2025-03-25 DIAGNOSIS — E66.811 OBESITY, CLASS I, BMI 30-34.9: Primary | ICD-10-CM

## 2025-03-25 RX ORDER — TIRZEPATIDE 5 MG/.5ML
5 INJECTION, SOLUTION SUBCUTANEOUS WEEKLY
Qty: 2 ML | Refills: 2 | Status: SHIPPED | OUTPATIENT
Start: 2025-03-25 | End: 2025-06-17

## 2025-03-26 ENCOUNTER — APPOINTMENT (OUTPATIENT)
Dept: LAB | Facility: AMBULARY SURGERY CENTER | Age: 60
End: 2025-03-26

## 2025-03-26 DIAGNOSIS — Z00.8 HEALTH EXAMINATION IN POPULATION SURVEYS: ICD-10-CM

## 2025-03-26 LAB
CHOLEST SERPL-MCNC: 218 MG/DL (ref ?–200)
EST. AVERAGE GLUCOSE BLD GHB EST-MCNC: 108 MG/DL
HBA1C MFR BLD: 5.4 %
HDLC SERPL-MCNC: 68 MG/DL
LDLC SERPL CALC-MCNC: 135 MG/DL (ref 0–100)
NONHDLC SERPL-MCNC: 150 MG/DL
TRIGL SERPL-MCNC: 73 MG/DL (ref ?–150)

## 2025-03-26 PROCEDURE — 83036 HEMOGLOBIN GLYCOSYLATED A1C: CPT

## 2025-03-26 PROCEDURE — 80061 LIPID PANEL: CPT

## 2025-03-26 PROCEDURE — 36415 COLL VENOUS BLD VENIPUNCTURE: CPT

## 2025-04-09 ENCOUNTER — OFFICE VISIT (OUTPATIENT)
Dept: FAMILY MEDICINE CLINIC | Facility: CLINIC | Age: 60
End: 2025-04-09
Payer: COMMERCIAL

## 2025-04-09 VITALS
HEIGHT: 62 IN | SYSTOLIC BLOOD PRESSURE: 114 MMHG | DIASTOLIC BLOOD PRESSURE: 84 MMHG | HEART RATE: 54 BPM | TEMPERATURE: 96.6 F | BODY MASS INDEX: 35.11 KG/M2 | WEIGHT: 190.8 LBS | OXYGEN SATURATION: 95 %

## 2025-04-09 DIAGNOSIS — F43.23 ADJUSTMENT DISORDER WITH MIXED ANXIETY AND DEPRESSED MOOD: ICD-10-CM

## 2025-04-09 PROCEDURE — 99213 OFFICE O/P EST LOW 20 MIN: CPT | Performed by: FAMILY MEDICINE

## 2025-04-09 NOTE — PROGRESS NOTES
"Name: Anayeli Ibrahim      : 1965      MRN: [id number]  Encounter Provider: Dee Dee Suggs DO  Encounter Date: 2025   Encounter department: [facility]  :  Assessment & Plan  Adjustment disorder with mixed anxiety and depressed mood  Doing much better with zoloft 50mg, but there is question of mild blunting as pt may not be able to cry as much.  For now continue current meds, but let me know if she feels like there is further blunting  F/u 6 mo  Orders:    sertraline (ZOLOFT) 50 mg tablet; Take 1 tablet (50 mg total) by mouth daily           History of Present Illness   HPI  Pt presents in f/u for zoloft start.  Doing much better.  Less irritable.  More controlled.  Isn't worrying as much.  No sadness, hopelessness.  Finds she doesn't feel as sad but isn't crying at things she'd normally cry at.  Is happy and can laugh and be silly.  No si/hi    Review of Systems  See hpi    Objective   /84 (Patient Position: Sitting, Cuff Size: Large)   Pulse (!) 54   Temp (!) 96.6 °F (35.9 °C) (Temporal)   Ht 5' 2\" (1.575 m)   Wt 86.5 kg (190 lb 12.8 oz)   LMP 2019   SpO2 95%   BMI 34.90 kg/m²      Physical Exam  Constitutional:       Appearance: Normal appearance.   HENT:      Head: Normocephalic and atraumatic.   Eyes:      Extraocular Movements: Extraocular movements intact.      Conjunctiva/sclera: Conjunctivae normal.   Cardiovascular:      Rate and Rhythm: Normal rate and regular rhythm.      Heart sounds: No murmur heard.     No friction rub. No gallop.   Pulmonary:      Effort: Pulmonary effort is normal.      Breath sounds: Normal breath sounds. No wheezing, rhonchi or rales.   Neurological:      General: No focal deficit present.      Mental Status: She is alert and oriented to person, place, and time.   Psychiatric:         Mood and Affect: Mood normal.         Behavior: Behavior normal.         Thought Content: Thought content normal.         Judgment: Judgment " normal.

## 2025-04-14 DIAGNOSIS — B00.1 HERPES LABIALIS: Primary | ICD-10-CM

## 2025-04-14 RX ORDER — VALACYCLOVIR HYDROCHLORIDE 1 G/1
1000 TABLET, FILM COATED ORAL 2 TIMES DAILY
Qty: 6 TABLET | Refills: 0 | Status: SHIPPED | OUTPATIENT
Start: 2025-04-14 | End: 2025-04-17

## 2025-06-20 ENCOUNTER — OFFICE VISIT (OUTPATIENT)
Age: 60
End: 2025-06-20
Payer: COMMERCIAL

## 2025-06-20 VITALS
SYSTOLIC BLOOD PRESSURE: 118 MMHG | HEART RATE: 62 BPM | DIASTOLIC BLOOD PRESSURE: 78 MMHG | TEMPERATURE: 97.2 F | WEIGHT: 183.6 LBS | BODY MASS INDEX: 33.79 KG/M2 | HEIGHT: 62 IN

## 2025-06-20 DIAGNOSIS — E78.00 HYPERCHOLESTEREMIA: ICD-10-CM

## 2025-06-20 DIAGNOSIS — E66.811 OBESITY, CLASS I, BMI 30-34.9: Primary | ICD-10-CM

## 2025-06-20 PROCEDURE — 99213 OFFICE O/P EST LOW 20 MIN: CPT | Performed by: PHYSICIAN ASSISTANT

## 2025-06-20 RX ORDER — TIRZEPATIDE 5 MG/.5ML
INJECTION, SOLUTION SUBCUTANEOUS
COMMUNITY
Start: 2025-05-29 | End: 2025-06-20 | Stop reason: DRUGHIGH

## 2025-06-20 RX ORDER — TIRZEPATIDE 7.5 MG/.5ML
7.5 INJECTION, SOLUTION SUBCUTANEOUS WEEKLY
Qty: 2 ML | Refills: 1 | Status: SHIPPED | OUTPATIENT
Start: 2025-06-20 | End: 2025-08-15

## 2025-06-20 NOTE — PROGRESS NOTES
Assessment/Plan:    1. Obesity, Class I, BMI 30-34.9  tirzepatide (Zepbound) 7.5 mg/0.5 mL auto-injector      2. Hypercholesteremia              Wegovy Start: 234 lbs  Last visit: 187 lbs BMI 34.31 (2/21/25)  Current: 183 lbs BMI 33.58 (6/20/25)   Change:  -51 lbs  Goal: 160 lbs    - Weight not at goal  - Patient is interested in Conservative Program  - Labs reviewed: As below.    General Recommendations:  Nutrition:  Eat breakfast daily.  Do not skip meals.     Food log (ie.) www.[employer].com, sparkpeople.com, loseit.com, calorieking.com, etc.    Practice mindful eating.  Be sure to set aside time to eat, eat slowly, and savor your food.    Hydration:    At least 64oz of water daily.  No sugar sweetened beverages.  No juice (eat the fruit instead).    Exercise:  Studies have shown that the ideal exercise goal is somewhere between 150 to 300 minutes of moderate intensity exercise a week.  Start with exercising 10 minutes every other day and gradually increase physical activity with a goal of at least 150 minutes of moderate intensity exercise a week, divided over at least 3 days a week.  An example of this would be exercising 30 minutes a day, 5 days a week.  Resistance training can increase muscle mass and increase our resting metabolic rate.   FULL BODY resistance training is recommended 2-3 times a week.  Do not do this on consecutive days to allow for muscle recovery.    Aim for a bare minimum 5000 steps, even on days you do not exercise.    Monitoring:   Weigh yourself daily.  If this causes undue stress, then just weigh yourself once a week.  Weigh yourself the same time of the day with the same amount of clothing on.  Preferably this should be done after waking up, before you eat, and with no clothing or minimal clothing on.    Calorie goal:  1400 alfredo/day    Return visit:    Calorie tracking/deficit  Physical activity goals  AOM  Contrapcetion: menopause  Weight has plateau on wegovy 2.4mg  Increase  zepbound to 7.5mg x1-2 months then 10mg maintenance thereafter  Patient denies personal history of pancreatitis. Patient also denies personal and family history of thyroid cancer and multiple endocrine neoplasia type 2 (MEN 2 tumor).   RTC: 4 months     Subjective:   Chief Complaint   Patient presents with    Follow-up     MWM 4MTH F/U---Waist: 36in       Patient ID: Anayeli Ibrahim  is a 59 y.o. female with excess weight/obesity here to pursue weight management. Patient is pursuing the [facility] Program.  Previous notes and records have been reviewed.    Past Medical History:   Diagnosis Date    BRCA1 negative     BRCA2 negative     Breast cancer (HCC)     Right    Cancer (HCC)     History of radiation therapy     Mitral valve prolapse      Past Surgical History:   Procedure Laterality Date    BREAST BIOPSY Right     Stereo     SECTION      MASTECTOMY Right 08    TUBAL LIGATION  10/2001       HPI:  Wt Readings from Last 20 Encounters:   25 83.3 kg (183 lb 9.6 oz)   25 86.5 kg (190 lb 12.8 oz)   25 85.1 kg (187 lb 9.6 oz)   25 87.2 kg (192 lb 3.2 oz)   24 85.6 kg (188 lb 12.8 oz)   24 85.5 kg (188 lb 9.6 oz)   24 86.5 kg (190 lb 12.8 oz)   24 84.8 kg (187 lb)   24 87.5 kg (193 lb)   23 88.9 kg (196 lb)   23 95.6 kg (210 lb 12.8 oz)   23 100 kg (221 lb)   23 106 kg (234 lb)   23 106 kg (234 lb)   22 104 kg (228 lb 9.9 oz)   22 102 kg (225 lb 6.4 oz)   21 87.5 kg (193 lb)   20 87.5 kg (193 lb)   19 87.5 kg (193 lb)   18 92.1 kg (203 lb 1.4 oz)     Previously was taking Wegovy 2.4 mg weekly. Some constipation with Wegovy, but not too bothersome.  BM - taking miralax   Switched to zepbound 2025  Current dose: 5mg    No nausea or vomiting  No abdominal pain  Bowel movements - slightly constipated. Miralax regimen   Mild appetite suppresison, mild earier satiety.      B:  "chobani yogurt  L: salad + greek yogurt + granol. Or taco bar  D: protein and veggie. Cheese steak or burger minus the bread    Exercise - currently none     Hydration: 64 oz of water, celsius one daily. Diet snapple   Alcohol: 1 drink per week if that  Smoking: denies  Exercise: none  Occupation:  gyn [employer]   Sleep: 6-8 hours      Colonoscopy: UTD, due again age 61  Mammogram: past due, was due March 2024, order offered, but she wants to get from her gyn    The following portions of the patient's history were reviewed and updated as appropriate: allergies, current medications, past family history, past medical history, past social history, past surgical history, and problem list.    Family History   Problem Relation Name Age of Onset    Breast cancer Mother Xxxxxx [id number]    Diabetes Father Duong Samuel     No Known Problems Daughter      No Known Problems Daughter      No Known Problems Daughter      No Known Problems Daughter      No Known Problems Maternal Grandmother      No Known Problems Maternal Grandfather      No Known Problems Paternal Grandmother      No Known Problems Paternal Grandfather          Review of Systems   HENT:  Negative for sore throat.    Respiratory:  Negative for cough and shortness of breath.    Cardiovascular:  Negative for chest pain and palpitations.   Gastrointestinal:  Positive for constipation (slight). Negative for abdominal pain, diarrhea, nausea and vomiting.        Denies GERD   Musculoskeletal:  Negative for arthralgias and back pain.   Skin:  Negative for rash.   Psychiatric/Behavioral:  Negative for suicidal ideas (or HI).         Denies depression and anxiety       Objective:  /78   Pulse 62   Temp (!) 97.2 °F (36.2 °C) (Tympanic)   Ht 5' 2\" (1.575 m)   Wt 83.3 kg (183 lb 9.6 oz)   LMP 09/17/2019   BMI 33.58 kg/m²     Physical Exam  Vitals and nursing note reviewed.        Constitutional   General appearance: Abnormal.  well " developed and obese.   Eyes No conjunctival injection.   Ears, Nose, Mouth, and Throat Oral mucosa moist.   Pulmonary   Respiratory effort: No increased work of breathing or signs of respiratory distress.     Cardiovascular     Examination of extremities for edema and/or varicosities: Normal.  no edema.   Abdomen   Abdomen: Abnormal.  The abdomen was obese.    Musculoskeletal   Normal range of motion  Neurological   Gait and station: Normal.    Psychiatric   Orientation to person, place and time: Normal.    Affect: appropriate

## 2025-07-10 DIAGNOSIS — L23.7 POISON IVY: Primary | ICD-10-CM

## 2025-07-10 RX ORDER — METHYLPREDNISOLONE 4 MG/1
TABLET ORAL
Qty: 1 EACH | Refills: 0 | Status: SHIPPED | OUTPATIENT
Start: 2025-07-10 | End: 2025-07-15

## 2025-07-15 ENCOUNTER — OFFICE VISIT (OUTPATIENT)
Dept: FAMILY MEDICINE CLINIC | Facility: CLINIC | Age: 60
End: 2025-07-15
Payer: COMMERCIAL

## 2025-07-15 ENCOUNTER — PATIENT MESSAGE (OUTPATIENT)
Dept: FAMILY MEDICINE CLINIC | Facility: CLINIC | Age: 60
End: 2025-07-15

## 2025-07-15 VITALS
HEART RATE: 61 BPM | WEIGHT: 189 LBS | OXYGEN SATURATION: 100 % | BODY MASS INDEX: 34.78 KG/M2 | DIASTOLIC BLOOD PRESSURE: 76 MMHG | TEMPERATURE: 97.4 F | SYSTOLIC BLOOD PRESSURE: 110 MMHG | HEIGHT: 62 IN

## 2025-07-15 DIAGNOSIS — E66.9 OBESITY (BMI 30-39.9): ICD-10-CM

## 2025-07-15 DIAGNOSIS — Z12.31 ENCOUNTER FOR SCREENING MAMMOGRAM FOR BREAST CANCER: ICD-10-CM

## 2025-07-15 DIAGNOSIS — L23.7 POISON IVY DERMATITIS: Primary | ICD-10-CM

## 2025-07-15 DIAGNOSIS — D05.11 DUCTAL CARCINOMA IN SITU (DCIS) OF RIGHT BREAST: ICD-10-CM

## 2025-07-15 PROCEDURE — 99214 OFFICE O/P EST MOD 30 MIN: CPT | Performed by: FAMILY MEDICINE

## 2025-08-05 ENCOUNTER — OFFICE VISIT (OUTPATIENT)
Dept: FAMILY MEDICINE CLINIC | Facility: CLINIC | Age: 60
End: 2025-08-05
Payer: COMMERCIAL

## 2025-08-05 VITALS
DIASTOLIC BLOOD PRESSURE: 64 MMHG | SYSTOLIC BLOOD PRESSURE: 100 MMHG | TEMPERATURE: 97.3 F | BODY MASS INDEX: 33.64 KG/M2 | HEIGHT: 62 IN | RESPIRATION RATE: 16 BRPM | OXYGEN SATURATION: 100 % | WEIGHT: 182.8 LBS | HEART RATE: 68 BPM

## 2025-08-05 DIAGNOSIS — Z00.00 PHYSICAL EXAM, ANNUAL: Primary | ICD-10-CM

## 2025-08-05 DIAGNOSIS — E78.00 HYPERCHOLESTEREMIA: ICD-10-CM

## 2025-08-05 DIAGNOSIS — Z23 ENCOUNTER FOR IMMUNIZATION: ICD-10-CM

## 2025-08-05 DIAGNOSIS — F43.23 ADJUSTMENT DISORDER WITH MIXED ANXIETY AND DEPRESSED MOOD: ICD-10-CM

## 2025-08-05 PROCEDURE — 90677 PCV20 VACCINE IM: CPT | Performed by: FAMILY MEDICINE

## 2025-08-05 PROCEDURE — 99396 PREV VISIT EST AGE 40-64: CPT | Performed by: FAMILY MEDICINE

## 2025-08-05 PROCEDURE — 90471 IMMUNIZATION ADMIN: CPT | Performed by: FAMILY MEDICINE

## 2025-08-16 ENCOUNTER — HOSPITAL ENCOUNTER (OUTPATIENT)
Dept: MAMMOGRAPHY | Facility: HOSPITAL | Age: 60
Discharge: HOME | End: 2025-08-16
Attending: FAMILY MEDICINE
Payer: COMMERCIAL

## 2025-08-16 VITALS — BODY MASS INDEX: 33.49 KG/M2 | WEIGHT: 182 LBS | HEIGHT: 62 IN

## 2025-08-16 DIAGNOSIS — Z12.31 ENCOUNTER FOR SCREENING MAMMOGRAM FOR BREAST CANCER: ICD-10-CM

## 2025-08-16 PROCEDURE — 77067 SCR MAMMO BI INCL CAD: CPT

## 2025-08-16 PROCEDURE — 77063 BREAST TOMOSYNTHESIS BI: CPT
